# Patient Record
Sex: FEMALE | Race: WHITE | NOT HISPANIC OR LATINO | Employment: OTHER | URBAN - METROPOLITAN AREA
[De-identification: names, ages, dates, MRNs, and addresses within clinical notes are randomized per-mention and may not be internally consistent; named-entity substitution may affect disease eponyms.]

---

## 2023-05-30 DIAGNOSIS — E78.5 HYPERLIPIDEMIA, UNSPECIFIED HYPERLIPIDEMIA TYPE: Primary | ICD-10-CM

## 2023-05-30 RX ORDER — ATORVASTATIN CALCIUM 20 MG/1
TABLET, FILM COATED ORAL
Qty: 90 TABLET | Refills: 0 | Status: SHIPPED | OUTPATIENT
Start: 2023-05-30 | End: 2023-08-30

## 2023-07-18 ENCOUNTER — OFFICE VISIT (OUTPATIENT)
Dept: PRIMARY CARE | Facility: CLINIC | Age: 88
End: 2023-07-18
Payer: MEDICARE

## 2023-07-18 ENCOUNTER — LAB (OUTPATIENT)
Dept: LAB | Facility: LAB | Age: 88
End: 2023-07-18
Payer: MEDICARE

## 2023-07-18 VITALS
DIASTOLIC BLOOD PRESSURE: 80 MMHG | SYSTOLIC BLOOD PRESSURE: 130 MMHG | WEIGHT: 98.2 LBS | BODY MASS INDEX: 17.4 KG/M2 | HEIGHT: 63 IN

## 2023-07-18 DIAGNOSIS — R26.89 IMBALANCE: ICD-10-CM

## 2023-07-18 DIAGNOSIS — R73.01 IMPAIRED FASTING BLOOD SUGAR: ICD-10-CM

## 2023-07-18 DIAGNOSIS — Z00.00 ROUTINE GENERAL MEDICAL EXAMINATION AT HEALTH CARE FACILITY: ICD-10-CM

## 2023-07-18 DIAGNOSIS — E78.5 HYPERLIPIDEMIA, UNSPECIFIED HYPERLIPIDEMIA TYPE: ICD-10-CM

## 2023-07-18 DIAGNOSIS — I25.10 CORONARY ARTERY DISEASE INVOLVING NATIVE HEART WITHOUT ANGINA PECTORIS, UNSPECIFIED VESSEL OR LESION TYPE: ICD-10-CM

## 2023-07-18 DIAGNOSIS — M81.0 OSTEOPOROSIS, UNSPECIFIED OSTEOPOROSIS TYPE, UNSPECIFIED PATHOLOGICAL FRACTURE PRESENCE: ICD-10-CM

## 2023-07-18 DIAGNOSIS — E46 PROTEIN-CALORIE MALNUTRITION, UNSPECIFIED SEVERITY (MULTI): Primary | ICD-10-CM

## 2023-07-18 PROBLEM — H90.3 ASYMMETRIC SNHL (SENSORINEURAL HEARING LOSS): Status: ACTIVE | Noted: 2023-07-18

## 2023-07-18 PROBLEM — I71.60 THORACOABDOMINAL ANEURYSM (CMS-HCC): Status: ACTIVE | Noted: 2023-07-18

## 2023-07-18 PROBLEM — I71.9 AORTIC ANEURYSM (CMS-HCC): Status: ACTIVE | Noted: 2023-07-18

## 2023-07-18 PROBLEM — I71.21 ANEURYSM OF ASCENDING AORTA (CMS-HCC): Status: ACTIVE | Noted: 2023-07-18

## 2023-07-18 LAB
ALANINE AMINOTRANSFERASE (SGPT) (U/L) IN SER/PLAS: 21 U/L (ref 7–45)
ANION GAP IN SER/PLAS: 14 MMOL/L (ref 10–20)
CALCIDIOL (25 OH VITAMIN D3) (NG/ML) IN SER/PLAS: 55 NG/ML
CALCIUM (MG/DL) IN SER/PLAS: 9.2 MG/DL (ref 8.6–10.3)
CARBON DIOXIDE, TOTAL (MMOL/L) IN SER/PLAS: 29 MMOL/L (ref 21–32)
CHLORIDE (MMOL/L) IN SER/PLAS: 102 MMOL/L (ref 98–107)
CHOLESTEROL (MG/DL) IN SER/PLAS: 144 MG/DL (ref 0–199)
CHOLESTEROL IN HDL (MG/DL) IN SER/PLAS: 72 MG/DL
CHOLESTEROL/HDL RATIO: 2
CREATININE (MG/DL) IN SER/PLAS: 0.93 MG/DL (ref 0.5–1.05)
ERYTHROCYTE DISTRIBUTION WIDTH (RATIO) BY AUTOMATED COUNT: 12.7 % (ref 11.5–14.5)
ERYTHROCYTE MEAN CORPUSCULAR HEMOGLOBIN CONCENTRATION (G/DL) BY AUTOMATED: 31.7 G/DL (ref 32–36)
ERYTHROCYTE MEAN CORPUSCULAR VOLUME (FL) BY AUTOMATED COUNT: 97 FL (ref 80–100)
ERYTHROCYTES (10*6/UL) IN BLOOD BY AUTOMATED COUNT: 4.95 X10E12/L (ref 4–5.2)
GFR FEMALE: 60 ML/MIN/1.73M2
GLUCOSE (MG/DL) IN SER/PLAS: 88 MG/DL (ref 74–99)
HEMATOCRIT (%) IN BLOOD BY AUTOMATED COUNT: 48 % (ref 36–46)
HEMOGLOBIN (G/DL) IN BLOOD: 15.2 G/DL (ref 12–16)
LDL: 57 MG/DL (ref 0–99)
LEUKOCYTES (10*3/UL) IN BLOOD BY AUTOMATED COUNT: 7.5 X10E9/L (ref 4.4–11.3)
PLATELETS (10*3/UL) IN BLOOD AUTOMATED COUNT: 207 X10E9/L (ref 150–450)
POTASSIUM (MMOL/L) IN SER/PLAS: 4.3 MMOL/L (ref 3.5–5.3)
SODIUM (MMOL/L) IN SER/PLAS: 141 MMOL/L (ref 136–145)
TRIGLYCERIDE (MG/DL) IN SER/PLAS: 74 MG/DL (ref 0–149)
UREA NITROGEN (MG/DL) IN SER/PLAS: 20 MG/DL (ref 6–23)
VLDL: 15 MG/DL (ref 0–40)

## 2023-07-18 PROCEDURE — 82306 VITAMIN D 25 HYDROXY: CPT

## 2023-07-18 PROCEDURE — G0439 PPPS, SUBSEQ VISIT: HCPCS | Performed by: INTERNAL MEDICINE

## 2023-07-18 PROCEDURE — 1170F FXNL STATUS ASSESSED: CPT | Performed by: INTERNAL MEDICINE

## 2023-07-18 PROCEDURE — 83036 HEMOGLOBIN GLYCOSYLATED A1C: CPT

## 2023-07-18 PROCEDURE — 85027 COMPLETE CBC AUTOMATED: CPT

## 2023-07-18 PROCEDURE — 80048 BASIC METABOLIC PNL TOTAL CA: CPT

## 2023-07-18 PROCEDURE — 36415 COLL VENOUS BLD VENIPUNCTURE: CPT

## 2023-07-18 PROCEDURE — 84460 ALANINE AMINO (ALT) (SGPT): CPT

## 2023-07-18 PROCEDURE — 1160F RVW MEDS BY RX/DR IN RCRD: CPT | Performed by: INTERNAL MEDICINE

## 2023-07-18 PROCEDURE — 1159F MED LIST DOCD IN RCRD: CPT | Performed by: INTERNAL MEDICINE

## 2023-07-18 PROCEDURE — 1036F TOBACCO NON-USER: CPT | Performed by: INTERNAL MEDICINE

## 2023-07-18 PROCEDURE — 80061 LIPID PANEL: CPT

## 2023-07-18 ASSESSMENT — ACTIVITIES OF DAILY LIVING (ADL)
BATHING: INDEPENDENT
DOING_HOUSEWORK: INDEPENDENT
TAKING_MEDICATION: INDEPENDENT
MANAGING_FINANCES: INDEPENDENT
GROCERY_SHOPPING: INDEPENDENT
DRESSING: INDEPENDENT

## 2023-07-18 ASSESSMENT — PATIENT HEALTH QUESTIONNAIRE - PHQ9
1. LITTLE INTEREST OR PLEASURE IN DOING THINGS: NOT AT ALL
2. FEELING DOWN, DEPRESSED OR HOPELESS: NOT AT ALL
SUM OF ALL RESPONSES TO PHQ9 QUESTIONS 1 AND 2: 0
2. FEELING DOWN, DEPRESSED OR HOPELESS: NOT AT ALL
1. LITTLE INTEREST OR PLEASURE IN DOING THINGS: NOT AT ALL
SUM OF ALL RESPONSES TO PHQ9 QUESTIONS 1 AND 2: 0

## 2023-07-18 NOTE — PROGRESS NOTES
"Subjective   Reason for Visit: Catarina Cutler is an 88 y.o. female here for a Medicare Wellness visit.     Past Medical, Surgical, and Family History reviewed and updated in chart.    Reviewed all medications by prescribing practitioner or clinical pharmacist (such as prescriptions, OTCs, herbal therapies and supplements) and documented in the medical record.    HPI    Patient Care Team:  Edilia Alex MD as PCP - General  Edilia Alex MD as PCP - [facility] Attributed Provider     Review of Systems    Objective   Vitals:  /80   Ht 1.588 m (5' 2.5\")   Wt (!) 44.5 kg (98 lb 3.2 oz)   BMI 17.67 kg/m²       Physical Exam    Assessment/Plan   Problem List Items Addressed This Visit     CAD (coronary artery disease)    Relevant Orders    CBC    Basic Metabolic Panel    Hyperlipidemia    Relevant Orders    Alanine Aminotransferase    Lipid Panel    Imbalance    Relevant Orders    Referral to Physical Therapy    Impaired fasting blood sugar    Relevant Orders    Hemoglobin A1C    Osteoporosis    Relevant Orders    Vitamin D, Total    XR DEXA bone density    Protein-calorie malnutrition, unspecified severity (CMS/[facility]) - Primary   Other Visit Diagnoses     Routine general medical examination at health care facility                 "

## 2023-07-18 NOTE — PROGRESS NOTES
"Subjective   Reason for Visit: Catarina Cutler is an 88 y.o. female here for a Medicare Wellness visit.     Past Medical, Surgical, and Family History reviewed and updated in chart.    Reviewed all medications by prescribing practitioner or clinical pharmacist (such as prescriptions, OTCs, herbal therapies and supplements) and documented in the medical record.    HPI  #1 hyperlipidemia-on treatment. Trying to keep diet low in fat and sugar.  #2 Osteoporosis-no falls, no fx;s  #3 coronary artery disease- no chest pain. No shortness of breath. No PND/orthopnea.  #4 atherosclerotic vascular disease/multiple aneurysmsâ€“has not followed up with vascular surgery. No chest pain. No abdominal pain  #5 impaired fasting blood sugar-trying to keep sugar and processed carbohydrates limited in diet.     Patient Care Team:  Edilia Alex MD as PCP - General  Edilia Alex MD as PCP - [facility] Attributed Provider     Review of Systems    Objective   Vitals:  /80   Ht 1.588 m (5' 2.5\")   Wt (!) 44.5 kg (98 lb 3.2 oz)   BMI 17.67 kg/m²       Physical Exam  Constitutional:       General: She is not in acute distress.     Appearance: Normal appearance. She is not ill-appearing.   HENT:      Head: Normocephalic and atraumatic.      Right Ear: Tympanic membrane and ear canal normal.      Left Ear: Tympanic membrane and ear canal normal.      Nose: Nose normal.      Mouth/Throat:      Mouth: Mucous membranes are moist.      Pharynx: Oropharynx is clear.   Eyes:      General: No scleral icterus.     Extraocular Movements: Extraocular movements intact.      Conjunctiva/sclera: Conjunctivae normal.      Pupils: Pupils are equal, round, and reactive to light.   Cardiovascular:      Rate and Rhythm: Normal rate and regular rhythm.      Pulses: Normal pulses.      Heart sounds: No murmur heard.     No friction rub.   Pulmonary:      Effort: Pulmonary effort is normal.      Breath sounds: Normal breath sounds. No rhonchi or " rales.   Abdominal:      General: Abdomen is flat. Bowel sounds are normal. There is no distension.      Palpations: Abdomen is soft. There is no mass.      Tenderness: There is no abdominal tenderness.   Musculoskeletal:      Cervical back: Normal range of motion and neck supple.      Right lower leg: No edema.      Left lower leg: No edema.   Skin:     General: Skin is warm.   Neurological:      General: No focal deficit present.      Mental Status: She is alert and oriented to person, place, and time.      Cranial Nerves: No cranial nerve deficit.   Psychiatric:         Mood and Affect: Mood normal.         Behavior: Behavior normal.         Judgment: Judgment normal.         Assessment/Plan   Problem List Items Addressed This Visit    None  #1 hyperlipidemia- labs  #2 osteoporosis-self discontinued treatment approximately 2 1/2 years ago. Had been on Fosamax for 5+ years, off for ~2 years back on for approximately 3 years. Small amount of progression over the past 2 years per last DEXA. Reviewed this at length. Recommend resuming treatment. She voiced clear understanding of the risk of untreated osteoporosis but at this point wishes to stay off medicine. Recommended discussion with rheumatology. She declined. We did discuss risk of untreated osteoporosis. Will obtain repeat bone density test 1 years. Continue vitamin D and calcium.  #3 coronary artery disease-stable. Continue treatment. Continue lifestyle. Consider stress test in next 1 years.  #4 atherosclerotic vascular disease/aortic aneurysm- remains clinically stable. Notable progression on neck CT 2 yrs ptv. I reviewed this at length with patient again. Discussed risk of rupture, perhaps imminent naturegiven recent imaging. Discussed that this would be a devastating/catastrophic event. We discussed possible outcomes most likely death and also stroke/disability. Again, I strongly recommend ASAP evaluation with vascular surgery, dedicated CT angiogram of  chest, etc.   She voiced understanding of my recommendations and on the risk of rupture and its consequences.  She continues to decline any further evaluation. Declines evaluation with surgery, declines CT angiogram. Asked her to phone me immediately if she changes her mind and I will help coordinate the appointment ASAP. Again, I spent significant time stressing how important I felt this was and how at the very least I feel she should sit down and talk with the vascular specialist about her options. Recommend beginning beta-blocker, reviewed indications. She declines.  #5 hearing loss( asymmetric) and vertigo-stable/improved. Status post ENT eval. f/u audiology  #6  Imbalance-consult physical therapy.  Fall precautions.    #7 impaired fasting blood sugarreviewed. Discussed low sugar low, carb diet with exercise. Recheck  #8 neck asymmetry-normal CT scan.     reviewed need for mammo--> she declines. voiced understand. -

## 2023-07-19 LAB
ESTIMATED AVERAGE GLUCOSE FOR HBA1C: 120 MG/DL
HEMOGLOBIN A1C/HEMOGLOBIN TOTAL IN BLOOD: 5.8 %

## 2023-07-27 ENCOUNTER — TELEPHONE (OUTPATIENT)
Dept: PRIMARY CARE | Facility: CLINIC | Age: 88
End: 2023-07-27
Payer: MEDICARE

## 2023-07-27 NOTE — TELEPHONE ENCOUNTER
----- Message from Edilia Alex MD sent at 7/27/2023  5:46 AM EDT -----  Notify patient bone density shows some progression of bone loss.  Recommend follow-up appointment with me to review treatment options or consult to rheumatology, bone density specialist.

## 2023-07-28 ENCOUNTER — TELEPHONE (OUTPATIENT)
Dept: PRIMARY CARE | Facility: CLINIC | Age: 88
End: 2023-07-28

## 2023-07-28 ENCOUNTER — OFFICE VISIT (OUTPATIENT)
Dept: PRIMARY CARE | Facility: CLINIC | Age: 88
End: 2023-07-28
Payer: MEDICARE

## 2023-07-28 VITALS
SYSTOLIC BLOOD PRESSURE: 142 MMHG | TEMPERATURE: 97.4 F | OXYGEN SATURATION: 96 % | BODY MASS INDEX: 18.25 KG/M2 | HEART RATE: 70 BPM | DIASTOLIC BLOOD PRESSURE: 78 MMHG | WEIGHT: 101.4 LBS

## 2023-07-28 DIAGNOSIS — M81.0 OSTEOPOROSIS, UNSPECIFIED OSTEOPOROSIS TYPE, UNSPECIFIED PATHOLOGICAL FRACTURE PRESENCE: Primary | ICD-10-CM

## 2023-07-28 PROCEDURE — 1160F RVW MEDS BY RX/DR IN RCRD: CPT | Performed by: INTERNAL MEDICINE

## 2023-07-28 PROCEDURE — 1036F TOBACCO NON-USER: CPT | Performed by: INTERNAL MEDICINE

## 2023-07-28 PROCEDURE — 99213 OFFICE O/P EST LOW 20 MIN: CPT | Performed by: INTERNAL MEDICINE

## 2023-07-28 PROCEDURE — 1159F MED LIST DOCD IN RCRD: CPT | Performed by: INTERNAL MEDICINE

## 2023-07-28 RX ORDER — DENOSUMAB 60 MG/ML
60 INJECTION SUBCUTANEOUS ONCE
Qty: 1 ML | Refills: 0 | Status: SHIPPED | OUTPATIENT
Start: 2023-07-28 | End: 2023-07-28

## 2023-07-28 ASSESSMENT — PATIENT HEALTH QUESTIONNAIRE - PHQ9
1. LITTLE INTEREST OR PLEASURE IN DOING THINGS: NOT AT ALL
2. FEELING DOWN, DEPRESSED OR HOPELESS: NOT AT ALL
SUM OF ALL RESPONSES TO PHQ9 QUESTIONS 1 AND 2: 0

## 2023-07-28 NOTE — TELEPHONE ENCOUNTER
LM for pt to call and schedule Prolia Inj (in fridge) at any time.     Prescribed at today's visit by Dr Alex.  [employer] submitted and no authorization is required

## 2023-07-28 NOTE — PROGRESS NOTES
Subjective   Patient ID: Catarina Cutler is a 88 y.o. female who presents for follow up labs.    HPI     Review of Systems    Objective   /78 (BP Location: Left arm, Patient Position: Sitting, BP Cuff Size: Adult)   Pulse 70   Temp 36.3 °C (97.4 °F) (Temporal)   Wt 46 kg (101 lb 6.4 oz)   SpO2 96%   BMI 18.25 kg/m²     Physical Exam  === 07/18/23 ===    DEXA BONE DENSITY      Lab Results   Component Value Date    WBC 7.5 07/18/2023    HGB 15.2 07/18/2023    HCT 48.0 (H) 07/18/2023     07/18/2023    CHOL 144 07/18/2023    TRIG 74 07/18/2023    HDL 72.0 07/18/2023    ALT 21 07/18/2023    AST 21 06/18/2019     07/18/2023    K 4.3 07/18/2023     07/18/2023    CREATININE 0.93 07/18/2023    BUN 20 07/18/2023    CO2 29 07/18/2023    TSH 1.03 07/15/2022    HGBA1C 5.8 (A) 07/18/2023       Assessment/Plan     Osteoporosis.  Progressed.  Had been on Fosamax for 5 years but off for 3+ years.  Will resume treatment.  Patient would like to use Prolia as does have an issue with reflux.  Reviewed side effects at length with patient.

## 2023-07-31 ENCOUNTER — CLINICAL SUPPORT (OUTPATIENT)
Dept: PRIMARY CARE | Facility: CLINIC | Age: 88
End: 2023-07-31
Payer: MEDICARE

## 2023-07-31 DIAGNOSIS — M81.0 OSTEOPOROSIS, UNSPECIFIED OSTEOPOROSIS TYPE, UNSPECIFIED PATHOLOGICAL FRACTURE PRESENCE: ICD-10-CM

## 2023-07-31 PROCEDURE — 96372 THER/PROPH/DIAG INJ SC/IM: CPT | Performed by: INTERNAL MEDICINE

## 2023-08-09 ENCOUNTER — TELEPHONE (OUTPATIENT)
Dept: PRIMARY CARE | Facility: CLINIC | Age: 88
End: 2023-08-09
Payer: MEDICARE
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
## 2023-08-14 ENCOUNTER — OFFICE VISIT (OUTPATIENT)
Dept: PRIMARY CARE | Facility: CLINIC | Age: 88
End: 2023-08-14
Payer: MEDICARE

## 2023-08-14 VITALS
TEMPERATURE: 97.6 F | SYSTOLIC BLOOD PRESSURE: 116 MMHG | WEIGHT: 99.8 LBS | DIASTOLIC BLOOD PRESSURE: 70 MMHG | BODY MASS INDEX: 17.96 KG/M2

## 2023-08-14 DIAGNOSIS — R41.89 SUBJECTIVE MEMORY COMPLAINTS: Primary | ICD-10-CM

## 2023-08-14 PROCEDURE — 1159F MED LIST DOCD IN RCRD: CPT | Performed by: INTERNAL MEDICINE

## 2023-08-14 PROCEDURE — 1160F RVW MEDS BY RX/DR IN RCRD: CPT | Performed by: INTERNAL MEDICINE

## 2023-08-14 PROCEDURE — 1036F TOBACCO NON-USER: CPT | Performed by: INTERNAL MEDICINE

## 2023-08-14 PROCEDURE — 99213 OFFICE O/P EST LOW 20 MIN: CPT | Performed by: INTERNAL MEDICINE

## 2023-08-14 RX ORDER — ASPIRIN 81 MG/1
81 TABLET ORAL
COMMUNITY
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
## 2023-08-30 DIAGNOSIS — E78.5 HYPERLIPIDEMIA, UNSPECIFIED HYPERLIPIDEMIA TYPE: ICD-10-CM

## 2023-08-30 RX ORDER — ATORVASTATIN CALCIUM 20 MG/1
20 TABLET, FILM COATED ORAL DAILY
Qty: 90 TABLET | Refills: 0 | Status: SHIPPED | OUTPATIENT
Start: 2023-08-30 | End: 2023-12-04

## 2023-12-04 DIAGNOSIS — E78.5 HYPERLIPIDEMIA, UNSPECIFIED HYPERLIPIDEMIA TYPE: ICD-10-CM

## 2023-12-04 RX ORDER — ATORVASTATIN CALCIUM 20 MG/1
20 TABLET, FILM COATED ORAL DAILY
Qty: 90 TABLET | Refills: 0 | Status: SHIPPED | OUTPATIENT
Start: 2023-12-04 | End: 2024-03-13

## 2024-01-30 ENCOUNTER — TELEPHONE (OUTPATIENT)
Dept: PRIMARY CARE | Facility: CLINIC | Age: 89
End: 2024-01-30
Payer: MEDICARE

## 2024-01-30 DIAGNOSIS — M81.0 OSTEOPOROSIS, UNSPECIFIED OSTEOPOROSIS TYPE, UNSPECIFIED PATHOLOGICAL FRACTURE PRESENCE: ICD-10-CM

## 2024-01-30 NOTE — TELEPHONE ENCOUNTER
Patient is scheduled for next Prolia Injection 2/2/24-BMP order entered into system (verbally ok'd per Dr. Alex) -LM notifying pt BW results needed reviewed prior to injection, that order for BMP is in system, and tc and rs if not able to have done prior to 2/2/24    Please advise if pt is to continue with injections.

## 2024-02-02 ENCOUNTER — APPOINTMENT (OUTPATIENT)
Dept: PRIMARY CARE | Facility: CLINIC | Age: 89
End: 2024-02-02
Payer: MEDICARE

## 2024-02-05 ENCOUNTER — LAB (OUTPATIENT)
Dept: LAB | Facility: LAB | Age: 89
End: 2024-02-05
Payer: MEDICARE

## 2024-02-05 ENCOUNTER — APPOINTMENT (OUTPATIENT)
Dept: PRIMARY CARE | Facility: CLINIC | Age: 89
End: 2024-02-05
Payer: MEDICARE

## 2024-02-05 DIAGNOSIS — M81.0 OSTEOPOROSIS, UNSPECIFIED OSTEOPOROSIS TYPE, UNSPECIFIED PATHOLOGICAL FRACTURE PRESENCE: ICD-10-CM

## 2024-02-05 LAB
ANION GAP SERPL CALC-SCNC: 12 MMOL/L (ref 10–20)
BUN SERPL-MCNC: 21 MG/DL (ref 6–23)
CALCIUM SERPL-MCNC: 8.7 MG/DL (ref 8.6–10.3)
CHLORIDE SERPL-SCNC: 100 MMOL/L (ref 98–107)
CO2 SERPL-SCNC: 31 MMOL/L (ref 21–32)
CREAT SERPL-MCNC: 0.89 MG/DL (ref 0.5–1.05)
EGFRCR SERPLBLD CKD-EPI 2021: 63 ML/MIN/1.73M*2
GLUCOSE SERPL-MCNC: 93 MG/DL (ref 74–99)
POTASSIUM SERPL-SCNC: 4.2 MMOL/L (ref 3.5–5.3)
SODIUM SERPL-SCNC: 139 MMOL/L (ref 136–145)

## 2024-02-05 PROCEDURE — 36415 COLL VENOUS BLD VENIPUNCTURE: CPT

## 2024-02-05 PROCEDURE — 80048 BASIC METABOLIC PNL TOTAL CA: CPT

## 2024-02-07 ENCOUNTER — CLINICAL SUPPORT (OUTPATIENT)
Dept: PRIMARY CARE | Facility: CLINIC | Age: 89
End: 2024-02-07
Payer: MEDICARE

## 2024-02-07 DIAGNOSIS — M81.0 OSTEOPOROSIS, UNSPECIFIED OSTEOPOROSIS TYPE, UNSPECIFIED PATHOLOGICAL FRACTURE PRESENCE: ICD-10-CM

## 2024-02-07 PROCEDURE — 96372 THER/PROPH/DIAG INJ SC/IM: CPT | Performed by: INTERNAL MEDICINE

## 2024-02-07 NOTE — PROGRESS NOTES
NURSE VISIT PER DR CARRASQUILLO FOR A PROLIA 60 MG /ML INJECTION GIVEN  SUBCUTANEOUS IN THE RIGHT ARM    IXB=[id number]  EXP=05/31/2026  NEC= [id number]  [facility]

## 2024-02-26 ENCOUNTER — HOSPITAL ENCOUNTER (EMERGENCY)
Facility: HOSPITAL | Age: 89
Discharge: HOME | End: 2024-02-26
Attending: EMERGENCY MEDICINE
Payer: MEDICARE

## 2024-02-26 ENCOUNTER — APPOINTMENT (OUTPATIENT)
Dept: RADIOLOGY | Facility: HOSPITAL | Age: 89
End: 2024-02-26
Payer: MEDICARE

## 2024-02-26 VITALS
BODY MASS INDEX: 16.99 KG/M2 | TEMPERATURE: 97.9 F | SYSTOLIC BLOOD PRESSURE: 149 MMHG | HEIGHT: 61 IN | HEART RATE: 70 BPM | OXYGEN SATURATION: 96 % | WEIGHT: 90 LBS | DIASTOLIC BLOOD PRESSURE: 119 MMHG | RESPIRATION RATE: 16 BRPM

## 2024-02-26 DIAGNOSIS — R51.9 NONINTRACTABLE HEADACHE, UNSPECIFIED CHRONICITY PATTERN, UNSPECIFIED HEADACHE TYPE: Primary | ICD-10-CM

## 2024-02-26 LAB
ALBUMIN SERPL BCP-MCNC: 3.8 G/DL (ref 3.4–5)
ALP SERPL-CCNC: 103 U/L (ref 33–136)
ALT SERPL W P-5'-P-CCNC: 24 U/L (ref 7–45)
ANION GAP SERPL CALC-SCNC: 13 MMOL/L (ref 10–20)
APTT PPP: 23 SECONDS (ref 27–38)
AST SERPL W P-5'-P-CCNC: 32 U/L (ref 9–39)
BASOPHILS # BLD AUTO: 0.04 X10*3/UL (ref 0–0.1)
BASOPHILS NFR BLD AUTO: 0.6 %
BILIRUB SERPL-MCNC: 0.7 MG/DL (ref 0–1.2)
BUN SERPL-MCNC: 13 MG/DL (ref 6–23)
CALCIUM SERPL-MCNC: 8.3 MG/DL (ref 8.6–10.3)
CHLORIDE SERPL-SCNC: 100 MMOL/L (ref 98–107)
CO2 SERPL-SCNC: 29 MMOL/L (ref 21–32)
CREAT SERPL-MCNC: 0.78 MG/DL (ref 0.5–1.05)
EGFRCR SERPLBLD CKD-EPI 2021: 74 ML/MIN/1.73M*2
EOSINOPHIL # BLD AUTO: 0.12 X10*3/UL (ref 0–0.4)
EOSINOPHIL NFR BLD AUTO: 1.9 %
ERYTHROCYTE [DISTWIDTH] IN BLOOD BY AUTOMATED COUNT: 13 % (ref 11.5–14.5)
GLUCOSE SERPL-MCNC: 90 MG/DL (ref 74–99)
HCT VFR BLD AUTO: 42.8 % (ref 36–46)
HGB BLD-MCNC: 14 G/DL (ref 12–16)
IMM GRANULOCYTES # BLD AUTO: 0.02 X10*3/UL (ref 0–0.5)
IMM GRANULOCYTES NFR BLD AUTO: 0.3 % (ref 0–0.9)
INR PPP: 1 (ref 0.9–1.1)
LYMPHOCYTES # BLD AUTO: 0.98 X10*3/UL (ref 0.8–3)
LYMPHOCYTES NFR BLD AUTO: 15.3 %
MCH RBC QN AUTO: 30.6 PG (ref 26–34)
MCHC RBC AUTO-ENTMCNC: 32.7 G/DL (ref 32–36)
MCV RBC AUTO: 93 FL (ref 80–100)
MONOCYTES # BLD AUTO: 0.46 X10*3/UL (ref 0.05–0.8)
MONOCYTES NFR BLD AUTO: 7.2 %
NEUTROPHILS # BLD AUTO: 4.8 X10*3/UL (ref 1.6–5.5)
NEUTROPHILS NFR BLD AUTO: 74.7 %
NRBC BLD-RTO: 0 /100 WBCS (ref 0–0)
PLATELET # BLD AUTO: 171 X10*3/UL (ref 150–450)
POTASSIUM SERPL-SCNC: 4.5 MMOL/L (ref 3.5–5.3)
PROT SERPL-MCNC: 7 G/DL (ref 6.4–8.2)
PROTHROMBIN TIME: 11.1 SECONDS (ref 9.8–12.8)
RBC # BLD AUTO: 4.58 X10*6/UL (ref 4–5.2)
SODIUM SERPL-SCNC: 137 MMOL/L (ref 136–145)
WBC # BLD AUTO: 6.4 X10*3/UL (ref 4.4–11.3)

## 2024-02-26 PROCEDURE — 2500000004 HC RX 250 GENERAL PHARMACY W/ HCPCS (ALT 636 FOR OP/ED): Performed by: EMERGENCY MEDICINE

## 2024-02-26 PROCEDURE — 2500000004 HC RX 250 GENERAL PHARMACY W/ HCPCS (ALT 636 FOR OP/ED)

## 2024-02-26 PROCEDURE — 80053 COMPREHEN METABOLIC PANEL: CPT | Performed by: EMERGENCY MEDICINE

## 2024-02-26 PROCEDURE — 2550000001 HC RX 255 CONTRASTS: Performed by: EMERGENCY MEDICINE

## 2024-02-26 PROCEDURE — 96375 TX/PRO/DX INJ NEW DRUG ADDON: CPT

## 2024-02-26 PROCEDURE — 36415 COLL VENOUS BLD VENIPUNCTURE: CPT | Performed by: EMERGENCY MEDICINE

## 2024-02-26 PROCEDURE — 99284 EMERGENCY DEPT VISIT MOD MDM: CPT | Mod: 25

## 2024-02-26 PROCEDURE — 85730 THROMBOPLASTIN TIME PARTIAL: CPT | Mod: 91 | Performed by: EMERGENCY MEDICINE

## 2024-02-26 PROCEDURE — 85025 COMPLETE CBC W/AUTO DIFF WBC: CPT | Performed by: EMERGENCY MEDICINE

## 2024-02-26 PROCEDURE — 70498 CT ANGIOGRAPHY NECK: CPT

## 2024-02-26 PROCEDURE — 96365 THER/PROPH/DIAG IV INF INIT: CPT

## 2024-02-26 PROCEDURE — 70496 CT ANGIOGRAPHY HEAD: CPT | Performed by: RADIOLOGY

## 2024-02-26 PROCEDURE — 70498 CT ANGIOGRAPHY NECK: CPT | Performed by: RADIOLOGY

## 2024-02-26 RX ORDER — MAGNESIUM SULFATE HEPTAHYDRATE 40 MG/ML
2 INJECTION, SOLUTION INTRAVENOUS ONCE
Status: COMPLETED | OUTPATIENT
Start: 2024-02-26 | End: 2024-02-26

## 2024-02-26 RX ORDER — DIPHENHYDRAMINE HYDROCHLORIDE 50 MG/ML
25 INJECTION INTRAMUSCULAR; INTRAVENOUS ONCE
Status: COMPLETED | OUTPATIENT
Start: 2024-02-26 | End: 2024-02-26

## 2024-02-26 RX ORDER — KETOROLAC TROMETHAMINE 30 MG/ML
15 INJECTION, SOLUTION INTRAMUSCULAR; INTRAVENOUS ONCE
Status: COMPLETED | OUTPATIENT
Start: 2024-02-26 | End: 2024-02-26

## 2024-02-26 RX ORDER — MAGNESIUM SULFATE HEPTAHYDRATE 40 MG/ML
INJECTION, SOLUTION INTRAVENOUS
Status: COMPLETED
Start: 2024-02-26 | End: 2024-02-26

## 2024-02-26 RX ORDER — KETOROLAC TROMETHAMINE 30 MG/ML
INJECTION, SOLUTION INTRAMUSCULAR; INTRAVENOUS
Status: COMPLETED
Start: 2024-02-26 | End: 2024-02-26

## 2024-02-26 RX ORDER — METOCLOPRAMIDE HYDROCHLORIDE 5 MG/ML
10 INJECTION INTRAMUSCULAR; INTRAVENOUS ONCE
Status: COMPLETED | OUTPATIENT
Start: 2024-02-26 | End: 2024-02-26

## 2024-02-26 RX ADMIN — MAGNESIUM SULFATE HEPTAHYDRATE 2 G: 40 INJECTION, SOLUTION INTRAVENOUS at 03:55

## 2024-02-26 RX ADMIN — METOCLOPRAMIDE 10 MG: 5 INJECTION, SOLUTION INTRAMUSCULAR; INTRAVENOUS at 01:57

## 2024-02-26 RX ADMIN — KETOROLAC TROMETHAMINE 15 MG: 30 INJECTION, SOLUTION INTRAMUSCULAR; INTRAVENOUS at 03:55

## 2024-02-26 RX ADMIN — KETOROLAC TROMETHAMINE 15 MG: 30 INJECTION, SOLUTION INTRAMUSCULAR at 03:55

## 2024-02-26 RX ADMIN — DIPHENHYDRAMINE HYDROCHLORIDE 25 MG: 50 INJECTION, SOLUTION INTRAMUSCULAR; INTRAVENOUS at 01:57

## 2024-02-26 RX ADMIN — IOHEXOL 75 ML: 350 INJECTION, SOLUTION INTRAVENOUS at 03:45

## 2024-02-26 RX ADMIN — SODIUM CHLORIDE 1000 ML: 9 INJECTION, SOLUTION INTRAVENOUS at 01:57

## 2024-02-26 ASSESSMENT — COLUMBIA-SUICIDE SEVERITY RATING SCALE - C-SSRS
2. HAVE YOU ACTUALLY HAD ANY THOUGHTS OF KILLING YOURSELF?: NO
6. HAVE YOU EVER DONE ANYTHING, STARTED TO DO ANYTHING, OR PREPARED TO DO ANYTHING TO END YOUR LIFE?: NO
1. IN THE PAST MONTH, HAVE YOU WISHED YOU WERE DEAD OR WISHED YOU COULD GO TO SLEEP AND NOT WAKE UP?: NO

## 2024-02-26 ASSESSMENT — PAIN DESCRIPTION - LOCATION: LOCATION: HEAD

## 2024-02-26 ASSESSMENT — PAIN - FUNCTIONAL ASSESSMENT: PAIN_FUNCTIONAL_ASSESSMENT: 0-10

## 2024-02-26 ASSESSMENT — PAIN SCALES - GENERAL: PAINLEVEL_OUTOF10: 10 - WORST POSSIBLE PAIN

## 2024-02-26 NOTE — ED PROVIDER NOTES
HPI   Chief Complaint   Patient presents with    Headache     Patient presents to the ED via EMS with c/o a headache since yesterday.       HPI  The patient presents with a headache beginning throughout the day and worsening gradually but does not more severe than that she has had in the past.  She has a history of a spontaneous brain bleed.  She has history of thoracic aneurysms in the past.  She denies any numbness, weakness, vision changes, chest pain, vomiting or other symptoms besides the pain itself.                  Chicho Coma Scale Score: 15                     Patient History   Past Medical History:   Diagnosis Date    Other specified health status 06/24/2015    No pertinent past medical history     Past Surgical History:   Procedure Laterality Date    APPENDECTOMY  03/12/2014    Appendectomy    CT ABDOMEN PELVIS ANGIOGRAM W AND/OR WO IV CONTRAST  6/6/2013    CT ABDOMEN PELVIS ANGIOGRAM W AND/OR WO IV CONTRAST 6/6/2013 [facility] ANCILLARY LEGACY    CT ABDOMEN PELVIS ANGIOGRAM W AND/OR WO IV CONTRAST  9/11/2014    CT ABDOMEN PELVIS ANGIOGRAM W AND/OR WO IV CONTRAST 9/11/2014 [facility]    CT ABDOMEN PELVIS ANGIOGRAM W AND/OR WO IV CONTRAST  6/27/2017    CT ABDOMEN PELVIS ANGIOGRAM W AND/OR WO IV CONTRAST 6/27/2017 [facility] ANCILLARY LEGACY    CT ABDOMEN PELVIS ANGIOGRAM W AND/OR WO IV CONTRAST  1/27/2012    CT ABDOMEN PELVIS ANGIOGRAM W AND/OR WO IV CONTRAST 1/27/2012 [facility] ANCILLARY LEGACY    HYSTERECTOMY  03/12/2014    Hysterectomy    MR HEAD ANGIO WO IV CONTRAST  3/21/2012    MR HEAD ANGIO WO IV CONTRAST 3/21/2012 [facility] CLINICAL LEGACY    MR NECK ANGIO WO IV CONTRAST  3/21/2012    MR NECK ANGIO WO IV CONTRAST 3/21/2012 [facility] CLINICAL LEGACY    OTHER SURGICAL HISTORY  03/12/2014    Aortic Arch Dissection Repair    OTHER SURGICAL HISTORY  09/30/2014    Laparoscopic Cholecystectomy With Cholangiography    TONSILLECTOMY  03/12/2014    Tonsillectomy With Adenoidectomy    TOTAL SHOULDER ARTHROPLASTY  03/12/2014     Shoulder Arthroplasty Total Shoulder Replacement     Family History   Problem Relation Name Age of Onset    Other (cardiac disorder) Father      Asthma Father       Social History     Tobacco Use    Smoking status: Former     Types: Cigarettes    Smokeless tobacco: Never   Substance Use Topics    Alcohol use: Never    Drug use: Never       Physical Exam   ED Triage Vitals   Temperature Heart Rate Respirations BP   02/26/24 0040 02/26/24 0045 02/26/24 0040 02/26/24 0040   36.6 °C (97.9 °F) 63 16 177/89      Pulse Ox Temp src Heart Rate Source Patient Position   02/26/24 0045 -- -- --   95 %         BP Location FiO2 (%)     -- --             Physical Exam  Vitals and nursing note reviewed.   Constitutional:       General: She is not in acute distress.     Appearance: She is well-developed.   HENT:      Head: Normocephalic and atraumatic.   Eyes:      Conjunctiva/sclera: Conjunctivae normal.   Cardiovascular:      Rate and Rhythm: Normal rate and regular rhythm.      Heart sounds: No murmur heard.  Pulmonary:      Effort: Pulmonary effort is normal. No respiratory distress.      Breath sounds: Normal breath sounds.   Abdominal:      Palpations: Abdomen is soft.      Tenderness: There is no abdominal tenderness.   Musculoskeletal:         General: No swelling.      Cervical back: Neck supple.   Skin:     General: Skin is warm and dry.      Capillary Refill: Capillary refill takes less than 2 seconds.   Neurological:      Mental Status: She is alert.   Psychiatric:         Mood and Affect: Mood normal.         ED Course & MDM   Diagnoses as of 02/26/24 0452   Nonintractable headache, unspecified chronicity pattern, unspecified headache type       Medical Decision Making  The patient is any age of 2.  However given her history of brain bleed in the past and onset 1 6 hours I did think a CTA of the head.  No obvious intracerebral aneurysm.  Patient does have a thoracic aneurysm which is apparently stable per radiology  report.    Procedure  Procedures     Sudhakar Moncada MD  02/26/24 0456

## 2024-03-13 DIAGNOSIS — E78.5 HYPERLIPIDEMIA, UNSPECIFIED HYPERLIPIDEMIA TYPE: ICD-10-CM

## 2024-03-13 RX ORDER — ATORVASTATIN CALCIUM 20 MG/1
20 TABLET, FILM COATED ORAL DAILY
Qty: 90 TABLET | Refills: 0 | Status: SHIPPED | OUTPATIENT
Start: 2024-03-13

## 2024-06-14 DIAGNOSIS — E78.5 HYPERLIPIDEMIA, UNSPECIFIED HYPERLIPIDEMIA TYPE: ICD-10-CM

## 2024-06-14 RX ORDER — ATORVASTATIN CALCIUM 20 MG/1
20 TABLET, FILM COATED ORAL DAILY
Qty: 90 TABLET | Refills: 0 | Status: SHIPPED | OUTPATIENT
Start: 2024-06-14

## 2024-07-15 ENCOUNTER — TELEPHONE (OUTPATIENT)
Dept: PRIMARY CARE | Facility: CLINIC | Age: 89
End: 2024-07-15
Payer: MEDICARE

## 2024-07-15 NOTE — TELEPHONE ENCOUNTER
"Patient is due for her next Prolia injection in August 2024.      Please enter referral to \"Clinical Pharmacy\" with \"Prolia-ship to office\" in the reason for referral area.      Patient will also need BMP performed prior to injection-please enter order.     Once medication is received in office,  I will reach out and schedule NV.     Thank you  "

## 2024-07-16 DIAGNOSIS — M81.0 OSTEOPOROSIS, UNSPECIFIED OSTEOPOROSIS TYPE, UNSPECIFIED PATHOLOGICAL FRACTURE PRESENCE: Primary | ICD-10-CM

## 2024-07-18 ENCOUNTER — APPOINTMENT (OUTPATIENT)
Dept: PRIMARY CARE | Facility: CLINIC | Age: 89
End: 2024-07-18
Payer: MEDICARE

## 2024-07-18 ENCOUNTER — TELEMEDICINE (OUTPATIENT)
Dept: PHARMACY | Facility: HOSPITAL | Age: 89
End: 2024-07-18
Payer: MEDICARE

## 2024-07-18 ENCOUNTER — LAB (OUTPATIENT)
Dept: LAB | Facility: LAB | Age: 89
End: 2024-07-18
Payer: MEDICARE

## 2024-07-18 VITALS
DIASTOLIC BLOOD PRESSURE: 72 MMHG | HEIGHT: 61 IN | TEMPERATURE: 98.1 F | WEIGHT: 91.8 LBS | SYSTOLIC BLOOD PRESSURE: 118 MMHG | BODY MASS INDEX: 17.33 KG/M2

## 2024-07-18 DIAGNOSIS — Z00.00 WELL ADULT EXAM: ICD-10-CM

## 2024-07-18 DIAGNOSIS — R73.01 IMPAIRED FASTING BLOOD SUGAR: ICD-10-CM

## 2024-07-18 DIAGNOSIS — M81.0 OSTEOPOROSIS, UNSPECIFIED OSTEOPOROSIS TYPE, UNSPECIFIED PATHOLOGICAL FRACTURE PRESENCE: ICD-10-CM

## 2024-07-18 DIAGNOSIS — Z00.00 WELL ADULT EXAM: Primary | ICD-10-CM

## 2024-07-18 LAB
ALT SERPL W P-5'-P-CCNC: 18 U/L (ref 7–45)
ANION GAP SERPL CALC-SCNC: 10 MMOL/L (ref 10–20)
BUN SERPL-MCNC: 13 MG/DL (ref 6–23)
CALCIUM SERPL-MCNC: 8.6 MG/DL (ref 8.6–10.3)
CHLORIDE SERPL-SCNC: 100 MMOL/L (ref 98–107)
CHOLEST SERPL-MCNC: 122 MG/DL (ref 0–199)
CHOLESTEROL/HDL RATIO: 2
CO2 SERPL-SCNC: 30 MMOL/L (ref 21–32)
CREAT SERPL-MCNC: 0.79 MG/DL (ref 0.5–1.05)
EGFRCR SERPLBLD CKD-EPI 2021: 73 ML/MIN/1.73M*2
ERYTHROCYTE [DISTWIDTH] IN BLOOD BY AUTOMATED COUNT: 12.5 % (ref 11.5–14.5)
GLUCOSE SERPL-MCNC: 87 MG/DL (ref 74–99)
HCT VFR BLD AUTO: 45.4 % (ref 36–46)
HDLC SERPL-MCNC: 60 MG/DL
HGB BLD-MCNC: 14.6 G/DL (ref 12–16)
LDLC SERPL CALC-MCNC: 48 MG/DL
MCH RBC QN AUTO: 31 PG (ref 26–34)
MCHC RBC AUTO-ENTMCNC: 32.2 G/DL (ref 32–36)
MCV RBC AUTO: 96 FL (ref 80–100)
NON HDL CHOLESTEROL: 62 MG/DL (ref 0–149)
NRBC BLD-RTO: 0 /100 WBCS (ref 0–0)
PLATELET # BLD AUTO: 183 X10*3/UL (ref 150–450)
POTASSIUM SERPL-SCNC: 4.4 MMOL/L (ref 3.5–5.3)
RBC # BLD AUTO: 4.71 X10*6/UL (ref 4–5.2)
SODIUM SERPL-SCNC: 136 MMOL/L (ref 136–145)
TRIGL SERPL-MCNC: 71 MG/DL (ref 0–149)
TSH SERPL-ACNC: 1.67 MIU/L (ref 0.44–3.98)
VLDL: 14 MG/DL (ref 0–40)
WBC # BLD AUTO: 6.2 X10*3/UL (ref 4.4–11.3)

## 2024-07-18 PROCEDURE — 36415 COLL VENOUS BLD VENIPUNCTURE: CPT

## 2024-07-18 PROCEDURE — 1036F TOBACCO NON-USER: CPT | Performed by: INTERNAL MEDICINE

## 2024-07-18 PROCEDURE — 1160F RVW MEDS BY RX/DR IN RCRD: CPT | Performed by: INTERNAL MEDICINE

## 2024-07-18 PROCEDURE — 1124F ACP DISCUSS-NO DSCNMKR DOCD: CPT | Performed by: INTERNAL MEDICINE

## 2024-07-18 PROCEDURE — G0439 PPPS, SUBSEQ VISIT: HCPCS | Performed by: INTERNAL MEDICINE

## 2024-07-18 PROCEDURE — 1170F FXNL STATUS ASSESSED: CPT | Performed by: INTERNAL MEDICINE

## 2024-07-18 PROCEDURE — 1157F ADVNC CARE PLAN IN RCRD: CPT | Performed by: INTERNAL MEDICINE

## 2024-07-18 PROCEDURE — 1159F MED LIST DOCD IN RCRD: CPT | Performed by: INTERNAL MEDICINE

## 2024-07-18 RX ORDER — DENOSUMAB 60 MG/ML
60 INJECTION SUBCUTANEOUS
Qty: 1 ML | Refills: 1 | Status: SHIPPED | OUTPATIENT
Start: 2024-07-18

## 2024-07-18 ASSESSMENT — PATIENT HEALTH QUESTIONNAIRE - PHQ9
2. FEELING DOWN, DEPRESSED OR HOPELESS: NOT AT ALL
SUM OF ALL RESPONSES TO PHQ9 QUESTIONS 1 AND 2: 0
1. LITTLE INTEREST OR PLEASURE IN DOING THINGS: NOT AT ALL

## 2024-07-18 NOTE — PROGRESS NOTES
"Subjective   Reason for Visit: Catarina Cutler is an 89 y.o. female here for a Medicare Wellness visit.     Past Medical, Surgical, and Family History reviewed and updated in chart.         HPI    #1 hyperlipidemia-on treatment. Trying to keep diet low in fat and sugar.  #2 Osteoporosis-no falls, no fx;s  #3 coronary artery disease- no chest pain. No shortness of breath. No PND/orthopnea.  #4 atherosclerotic vascular disease/multiple aneurysmsâ€“has not followed up with vascular surgery. No chest pain. No abdominal pain  #5 impaired fasting blood sugar-trying to keep sugar and processed carbohydrates limited in diet.     Patient Care Team:  Edilia Alex MD as PCP - General  Edilia Alex MD as PCP - [facility] Attributed Provider     Review of Systems    Objective   Vitals:  /72   Temp 36.7 °C (98.1 °F)   Ht 1.556 m (5' 1.26\")   Wt (!) 41.6 kg (91 lb 12.8 oz)   BMI 17.20 kg/m²       Physical Exam    Assessment/Plan   Problem List Items Addressed This Visit    None  #1 hyperlipidemia- labs  #2 osteoporosis- on rx, con't   #3 coronary artery disease-stable. Continue treatment. Continue lifestyle. Consider stress test in next 1 years- she declines.  #4 atherosclerotic vascular disease/aortic aneurysm- remains clinically stable.   Declines evaluation with surgery, declines CT angiogram.    #5 hearing loss( asymmetric) and vertigo-stable/improved. Status post ENT eval. f/u audiology  #6  Imbalance-consult physical therapy.  Fall precautions.    #7 impaired fasting blood sugarreviewed. Discussed low sugar low, carb diet with exercise. Recheck  #8 neck asymmetry-normal CT scan.     reviewed need for mammo--> she declines. voiced understand. -            "

## 2024-07-18 NOTE — PROGRESS NOTES
"Pharmacist Clinic  07/18/24     Catarina Cutler  was referred to the Clinical Pharmacy Team for her osteoporosis management and Prolia initiation.    Referring Provider: Edilia Alex MD   _______________________________________________________________________  OSTEOPOROSIS ASSESSMENT    CURRENT PHARMACOTHERAPY FOR OSTEOPOROSIS  - Prolia 60 mg --- admin'd on 2/5/24    HISTORICAL PHARMACOTHERAPY FOR OSTEOPOROSIS  - Alendronate 70 mg weekly     === 07/18/23 ===    DEXA BONE DENSITY     RELEVANT LAB RESULTS  Lab Results   Component Value Date    CREATININE 0.78 02/26/2024    BUN 13 02/26/2024     02/26/2024    K 4.5 02/26/2024     02/26/2024    CO2 29 02/26/2024      Lab Results   Component Value Date    CHOL 144 07/18/2023    CHOL 145 07/15/2022    CHOL 138 06/25/2021     Lab Results   Component Value Date    HDL 72.0 07/18/2023    HDL 60.4 07/15/2022    HDL 59.2 06/25/2021     No results found for: \"LDLCALC\"  Lab Results   Component Value Date    TRIG 74 07/18/2023    TRIG 82 07/15/2022    TRIG 62 06/25/2021     No components found for: \"CHOLHDL\"     ADDITIONAL INFORMATION  Calcium/Vitamin D therapy: Yes  _______________________________________________________________________  RECOMMENDATIONS/PLAN  1. Continue taking Prolia 60 mg under the skin once every 6 months.   2. CONTINUE Calcium and Vitamin D supplement  3. Prescription to  [facility]ty Pharmacy for assistance with coverage and medication will be delivered to office for administration.   4. Patient to complete CMP within 30 days prior to Prolia injection.     Clinical Pharmacist follow-up: as needed  Type of Encounter: Virtual    Thank you,  Fab Magallanes, PharmD    Continue all meds under the continuation of care with the referring provider and clinical pharmacy team.    "

## 2024-07-19 ENCOUNTER — SPECIALTY PHARMACY (OUTPATIENT)
Dept: PHARMACY | Facility: CLINIC | Age: 89
End: 2024-07-19

## 2024-07-19 LAB
EST. AVERAGE GLUCOSE BLD GHB EST-MCNC: 114 MG/DL
HBA1C MFR BLD: 5.6 %

## 2024-07-19 ASSESSMENT — ACTIVITIES OF DAILY LIVING (ADL)
MANAGING_FINANCES: INDEPENDENT
GROCERY_SHOPPING: INDEPENDENT
TAKING_MEDICATION: INDEPENDENT
DRESSING: INDEPENDENT
BATHING: INDEPENDENT
DOING_HOUSEWORK: INDEPENDENT

## 2024-08-20 ENCOUNTER — APPOINTMENT (OUTPATIENT)
Dept: PHARMACY | Facility: HOSPITAL | Age: 89
End: 2024-08-20
Payer: MEDICARE

## 2024-08-20 ENCOUNTER — TELEPHONE (OUTPATIENT)
Dept: PRIMARY CARE | Facility: CLINIC | Age: 89
End: 2024-08-20

## 2024-08-20 NOTE — TELEPHONE ENCOUNTER
Patient LVM, said she was supposed to get a call from  Pharm for the video appt, but she never got the call (after waiting 30 minutes).  She called them and they told her it had been canceled, that she didn't need to have the appt in the 1st place, that it's only to deal with people who have a problem with the med or problem with paying for it.      What now?  How does she get the Prolia inj?      Can you assist with this?  She can be reached at [phone].

## 2024-08-30 NOTE — TELEPHONE ENCOUNTER
"I reached out to the patient again to try to get patient to schedule her Prolia injection after verifying her benefits and initiating past process of process of Prolia injections.  She hung on me last week and stating everything is too complicated and hung up on me.    This is the patient we discussed regarding her experience with our new way the office started in June for Prolia injections to the clinical pharmacy.      I informed Ms. Cutler that you ok's using the BMP results performed 07/18/24 IF SHE RECEIVED THE INJECTION THE WEEK OF 09/01/24-09/07/24 so she did not have to be redrawn.      She was informed the the pharmacy she would at least be responsible for $50 with pt assistance and is still too much out of pocket at this time and that it is a complicated time for her.    Ms. Cutler states that although she is appreciative of our follow up and her care-he is still not going to continue with her Prolia injections at this time and \"will try to keep herself healthy and will see us next July\".       I messaged pharmacist Ana María Birmingham a request to cancel the referral to the clinical pharmacy for Prolia.  "

## 2024-09-19 DIAGNOSIS — E78.5 HYPERLIPIDEMIA, UNSPECIFIED HYPERLIPIDEMIA TYPE: ICD-10-CM

## 2024-09-19 RX ORDER — ATORVASTATIN CALCIUM 20 MG/1
20 TABLET, FILM COATED ORAL DAILY
Qty: 90 TABLET | Refills: 0 | Status: SHIPPED | OUTPATIENT
Start: 2024-09-19

## 2024-12-09 ENCOUNTER — APPOINTMENT (OUTPATIENT)
Dept: RADIOLOGY | Facility: HOSPITAL | Age: 89
End: 2024-12-09
Payer: MEDICARE

## 2024-12-09 ENCOUNTER — HOSPITAL ENCOUNTER (EMERGENCY)
Facility: HOSPITAL | Age: 89
Discharge: OTHER INSTITUTION | End: 2024-12-09
Attending: INTERNAL MEDICINE
Payer: MEDICARE

## 2024-12-09 ENCOUNTER — APPOINTMENT (OUTPATIENT)
Dept: CARDIOLOGY | Facility: HOSPITAL | Age: 89
End: 2024-12-09
Payer: MEDICARE

## 2024-12-09 VITALS
HEIGHT: 61 IN | DIASTOLIC BLOOD PRESSURE: 70 MMHG | TEMPERATURE: 97.8 F | OXYGEN SATURATION: 94 % | WEIGHT: 90 LBS | RESPIRATION RATE: 15 BRPM | SYSTOLIC BLOOD PRESSURE: 115 MMHG | BODY MASS INDEX: 16.99 KG/M2 | HEART RATE: 57 BPM

## 2024-12-09 DIAGNOSIS — S22.050A: ICD-10-CM

## 2024-12-09 DIAGNOSIS — I71.20 THORACIC AORTIC ANEURYSM (TAA), UNSPECIFIED PART, UNSPECIFIED WHETHER RUPTURED (CMS-HCC): Primary | ICD-10-CM

## 2024-12-09 LAB
ALBUMIN SERPL BCP-MCNC: 3.8 G/DL (ref 3.4–5)
ALP SERPL-CCNC: 108 U/L (ref 33–136)
ALT SERPL W P-5'-P-CCNC: 17 U/L (ref 7–45)
ANION GAP SERPL CALC-SCNC: 13 MMOL/L (ref 10–20)
APPEARANCE UR: CLEAR
AST SERPL W P-5'-P-CCNC: 28 U/L (ref 9–39)
BASOPHILS # BLD AUTO: 0.02 X10*3/UL (ref 0–0.1)
BASOPHILS NFR BLD AUTO: 0.2 %
BILIRUB SERPL-MCNC: 0.8 MG/DL (ref 0–1.2)
BILIRUB UR STRIP.AUTO-MCNC: NEGATIVE MG/DL
BUN SERPL-MCNC: 12 MG/DL (ref 6–23)
CALCIUM SERPL-MCNC: 8.3 MG/DL (ref 8.6–10.3)
CARDIAC TROPONIN I PNL SERPL HS: 33 NG/L (ref 0–13)
CARDIAC TROPONIN I PNL SERPL HS: 37 NG/L (ref 0–13)
CHLORIDE SERPL-SCNC: 103 MMOL/L (ref 98–107)
CO2 SERPL-SCNC: 27 MMOL/L (ref 21–32)
COLOR UR: ABNORMAL
CREAT SERPL-MCNC: 0.66 MG/DL (ref 0.5–1.05)
EGFRCR SERPLBLD CKD-EPI 2021: 85 ML/MIN/1.73M*2
EOSINOPHIL # BLD AUTO: 0.01 X10*3/UL (ref 0–0.4)
EOSINOPHIL NFR BLD AUTO: 0.1 %
ERYTHROCYTE [DISTWIDTH] IN BLOOD BY AUTOMATED COUNT: 12.2 % (ref 11.5–14.5)
GLUCOSE SERPL-MCNC: 111 MG/DL (ref 74–99)
GLUCOSE UR STRIP.AUTO-MCNC: NORMAL MG/DL
HCT VFR BLD AUTO: 41.2 % (ref 36–46)
HGB BLD-MCNC: 13.6 G/DL (ref 12–16)
IMM GRANULOCYTES # BLD AUTO: 0.04 X10*3/UL (ref 0–0.5)
IMM GRANULOCYTES NFR BLD AUTO: 0.4 % (ref 0–0.9)
KETONES UR STRIP.AUTO-MCNC: ABNORMAL MG/DL
LEUKOCYTE ESTERASE UR QL STRIP.AUTO: NEGATIVE
LYMPHOCYTES # BLD AUTO: 0.56 X10*3/UL (ref 0.8–3)
LYMPHOCYTES NFR BLD AUTO: 5.8 %
MCH RBC QN AUTO: 30.9 PG (ref 26–34)
MCHC RBC AUTO-ENTMCNC: 33 G/DL (ref 32–36)
MCV RBC AUTO: 94 FL (ref 80–100)
MONOCYTES # BLD AUTO: 0.38 X10*3/UL (ref 0.05–0.8)
MONOCYTES NFR BLD AUTO: 3.9 %
MUCOUS THREADS #/AREA URNS AUTO: NORMAL /LPF
NEUTROPHILS # BLD AUTO: 8.66 X10*3/UL (ref 1.6–5.5)
NEUTROPHILS NFR BLD AUTO: 89.6 %
NITRITE UR QL STRIP.AUTO: NEGATIVE
NRBC BLD-RTO: 0 /100 WBCS (ref 0–0)
PH UR STRIP.AUTO: 6.5 [PH]
PLATELET # BLD AUTO: 131 X10*3/UL (ref 150–450)
POTASSIUM SERPL-SCNC: 4 MMOL/L (ref 3.5–5.3)
PROT SERPL-MCNC: 6.5 G/DL (ref 6.4–8.2)
PROT UR STRIP.AUTO-MCNC: ABNORMAL MG/DL
RBC # BLD AUTO: 4.4 X10*6/UL (ref 4–5.2)
RBC # UR STRIP.AUTO: NEGATIVE /UL
RBC #/AREA URNS AUTO: NORMAL /HPF
SODIUM SERPL-SCNC: 139 MMOL/L (ref 136–145)
SP GR UR STRIP.AUTO: 1.02
SQUAMOUS #/AREA URNS AUTO: NORMAL /HPF
UROBILINOGEN UR STRIP.AUTO-MCNC: NORMAL MG/DL
WBC # BLD AUTO: 9.7 X10*3/UL (ref 4.4–11.3)
WBC #/AREA URNS AUTO: NORMAL /HPF

## 2024-12-09 PROCEDURE — 71275 CT ANGIOGRAPHY CHEST: CPT | Performed by: RADIOLOGY

## 2024-12-09 PROCEDURE — 71045 X-RAY EXAM CHEST 1 VIEW: CPT | Mod: FOREIGN READ | Performed by: RADIOLOGY

## 2024-12-09 PROCEDURE — 81001 URINALYSIS AUTO W/SCOPE: CPT | Performed by: INTERNAL MEDICINE

## 2024-12-09 PROCEDURE — 84484 ASSAY OF TROPONIN QUANT: CPT | Performed by: INTERNAL MEDICINE

## 2024-12-09 PROCEDURE — 71045 X-RAY EXAM CHEST 1 VIEW: CPT

## 2024-12-09 PROCEDURE — 72131 CT LUMBAR SPINE W/O DYE: CPT | Mod: FOREIGN READ | Performed by: RADIOLOGY

## 2024-12-09 PROCEDURE — 36415 COLL VENOUS BLD VENIPUNCTURE: CPT | Performed by: INTERNAL MEDICINE

## 2024-12-09 PROCEDURE — 72131 CT LUMBAR SPINE W/O DYE: CPT

## 2024-12-09 PROCEDURE — 99291 CRITICAL CARE FIRST HOUR: CPT | Mod: 25 | Performed by: INTERNAL MEDICINE

## 2024-12-09 PROCEDURE — 96374 THER/PROPH/DIAG INJ IV PUSH: CPT | Mod: 59

## 2024-12-09 PROCEDURE — 85025 COMPLETE CBC W/AUTO DIFF WBC: CPT | Performed by: INTERNAL MEDICINE

## 2024-12-09 PROCEDURE — 96375 TX/PRO/DX INJ NEW DRUG ADDON: CPT | Mod: 59

## 2024-12-09 PROCEDURE — 2500000001 HC RX 250 WO HCPCS SELF ADMINISTERED DRUGS (ALT 637 FOR MEDICARE OP): Performed by: INTERNAL MEDICINE

## 2024-12-09 PROCEDURE — 96376 TX/PRO/DX INJ SAME DRUG ADON: CPT | Mod: 59

## 2024-12-09 PROCEDURE — 72128 CT CHEST SPINE W/O DYE: CPT

## 2024-12-09 PROCEDURE — 93005 ELECTROCARDIOGRAM TRACING: CPT

## 2024-12-09 PROCEDURE — 2500000004 HC RX 250 GENERAL PHARMACY W/ HCPCS (ALT 636 FOR OP/ED): Performed by: INTERNAL MEDICINE

## 2024-12-09 PROCEDURE — 71275 CT ANGIOGRAPHY CHEST: CPT

## 2024-12-09 PROCEDURE — 2550000001 HC RX 255 CONTRASTS: Performed by: INTERNAL MEDICINE

## 2024-12-09 PROCEDURE — 72128 CT CHEST SPINE W/O DYE: CPT | Mod: FOREIGN READ | Performed by: RADIOLOGY

## 2024-12-09 PROCEDURE — 74174 CTA ABD&PLVS W/CONTRAST: CPT | Performed by: RADIOLOGY

## 2024-12-09 PROCEDURE — 84132 ASSAY OF SERUM POTASSIUM: CPT | Performed by: INTERNAL MEDICINE

## 2024-12-09 RX ORDER — LABETALOL HYDROCHLORIDE 5 MG/ML
10 INJECTION, SOLUTION INTRAVENOUS ONCE AS NEEDED
Status: COMPLETED | OUTPATIENT
Start: 2024-12-09 | End: 2024-12-09

## 2024-12-09 RX ORDER — MORPHINE SULFATE 4 MG/ML
4 INJECTION, SOLUTION INTRAMUSCULAR; INTRAVENOUS ONCE
Status: COMPLETED | OUTPATIENT
Start: 2024-12-09 | End: 2024-12-09

## 2024-12-09 RX ORDER — KETOROLAC TROMETHAMINE 30 MG/ML
15 INJECTION, SOLUTION INTRAMUSCULAR; INTRAVENOUS ONCE
Status: COMPLETED | OUTPATIENT
Start: 2024-12-09 | End: 2024-12-09

## 2024-12-09 RX ORDER — LABETALOL HYDROCHLORIDE 5 MG/ML
20 INJECTION, SOLUTION INTRAVENOUS ONCE
Status: COMPLETED | OUTPATIENT
Start: 2024-12-09 | End: 2024-12-09

## 2024-12-09 RX ORDER — CYCLOBENZAPRINE HCL 5 MG
5 TABLET ORAL ONCE
Status: COMPLETED | OUTPATIENT
Start: 2024-12-09 | End: 2024-12-09

## 2024-12-09 RX ORDER — LABETALOL HYDROCHLORIDE 5 MG/ML
10 INJECTION, SOLUTION INTRAVENOUS ONCE
Status: COMPLETED | OUTPATIENT
Start: 2024-12-09 | End: 2024-12-09

## 2024-12-09 ASSESSMENT — PAIN DESCRIPTION - LOCATION
LOCATION: BACK

## 2024-12-09 ASSESSMENT — PAIN SCALES - GENERAL
PAINLEVEL_OUTOF10: 10 - WORST POSSIBLE PAIN
PAINLEVEL_OUTOF10: 10 - WORST POSSIBLE PAIN
PAINLEVEL_OUTOF10: 0 - NO PAIN
PAINLEVEL_OUTOF10: 10 - WORST POSSIBLE PAIN

## 2024-12-09 ASSESSMENT — PAIN DESCRIPTION - PAIN TYPE
TYPE: ACUTE PAIN
TYPE: ACUTE PAIN

## 2024-12-09 ASSESSMENT — PAIN - FUNCTIONAL ASSESSMENT
PAIN_FUNCTIONAL_ASSESSMENT: 0-10

## 2024-12-09 ASSESSMENT — PAIN DESCRIPTION - ORIENTATION
ORIENTATION: RIGHT;LEFT;UPPER;MID
ORIENTATION: LOWER;MID;UPPER
ORIENTATION: LOWER;MID;UPPER

## 2024-12-09 NOTE — ED TRIAGE NOTES
Pt to ED from home for back pain without injury. Pt endorses pain between her shoulder blades down to her lower back that began this morning at 2AM. Pt endorses the pain woke her up out of her sleep. EMS gave 50mcg of Fetanyl and 4mg of Zofran prior to arrival with no changes in pain.

## 2024-12-09 NOTE — ED PROVIDER NOTES
HPI     CC: Back Pain     HPI: Catarina Cutler is a 89 y.o. female with a history of glaucoma, HLD, presents with atraumatic back pain. Patient states that she woke up this morning with pain in her back.  She states she is not sure where the pain is localized, states it feels like it is all over.  It is nonradiating.  Denies neck pain or headache.  She denies any numbness/tingling, weakness, bowel or bladder changes, fever, chills, urinary symptoms.  She has never had anything like this before.  It hurts to move.  She denies any anterior chest pain or abdominal pain, but did have an episode of vomiting.  She denies any history of trauma.  She received something en route which did not help her pain.    ROS: 10-point review of systems was performed and is otherwise negative except as noted in HPI.    Limitations to history: N/A    Independent Historians: N/A    External Records Reviewed: Outpatient notes in EMR    Past Medical History: Noncontributory except per HPI     Past Surgical History: Noncontributory except per HPI     Family History: Reviewed and noncontributory     Social History:  Denies tobacco.  Drinks 2 glasses of wine per night. Denies illicit drugs.    Social Determinants Affecting Care: N/A    Allergies   Allergen Reactions    Penicillins Other, Unknown, Swelling and Rash       Home Meds:   Current Outpatient Medications   Medication Instructions    aspirin 81 mg, oral, Daily RT    atorvastatin (LIPITOR) 20 mg, oral, Daily    multivit-min/ferrous fumarate (MULTI VITAMIN ORAL) oral, Daily    Prolia 60 mg, subcutaneous, Every 6 months        Physical Exam     ED Triage Vitals [12/09/24 1335]   Temperature Heart Rate Respirations BP   36.6 °C (97.8 °F) 73 16 113/76      Pulse Ox Temp Source Heart Rate Source Patient Position   94 % Oral -- --      BP Location FiO2 (%)     -- --         Heart Rate:  [58-85]   Temperature:  [36.6 °C (97.8 °F)]   Respirations:  [15-21]   BP: (113-169)/()   Height:   "[154.9 cm (5' 1\")]   Weight:  [40.8 kg (90 lb)]   Pulse Ox:  [88 %-100 %]      Physical Exam  Vitals and nursing note reviewed.     CONSTITUTIONAL: Uncomfortable appearing, well nourished, in no acute distress.   HENMT: Head atraumatic. Airway patent. Nasal mucosa clear. Mouth with normal mucosa, clear oropharynx. Uvula midline. Neck supple.    EYES: Clear bilaterally, pupils equally round and reactive to light.   CARDIOVASCULAR: Normal rate, regular rhythm.  Heart sounds S1, S2.  No murmurs, rubs or gallops. Normal pulses. Capillary refill < 2 sec.   RESPIRATORY: No increased work of breathing. Breath sounds clear and equal bilaterally.  GASTROINTESTINAL: Abdomen soft, non-distended, non-tender. No rebound, no guarding. Normal bowel sounds. No palpable masses.  GENITOURINARY:  No CVA tenderness.  MUSCULOSKELETAL: Spine appears normal, range of motion is not limited, diffuse tenderness throughout midline thoracic spine.  No paraspinal TTP.  No other midline spinal tenderness of the cervical or lumbar spine.  No edema.   NEUROLOGICAL: AAO x3. CN II-XII intact. 5/5 strength and SILT UEs/LEs.  2+ patellar reflexes.    PSYCHIATRIC: Normal mood and affect.  HEME/LYMPH: No adenopathy or splenomegaly.    Diagnostic Results      ECG: ECGs read and interpreted by me. See ED Course, below, for interpretation.    Labs Reviewed   CBC WITH AUTO DIFFERENTIAL - Abnormal       Result Value    WBC 9.7      nRBC 0.0      RBC 4.40      Hemoglobin 13.6      Hematocrit 41.2      MCV 94      MCH 30.9      MCHC 33.0      RDW 12.2      Platelets 131 (*)     Neutrophils % 89.6      Immature Granulocytes %, Automated 0.4      Lymphocytes % 5.8      Monocytes % 3.9      Eosinophils % 0.1      Basophils % 0.2      Neutrophils Absolute 8.66 (*)     Immature Granulocytes Absolute, Automated 0.04      Lymphocytes Absolute 0.56 (*)     Monocytes Absolute 0.38      Eosinophils Absolute 0.01      Basophils Absolute 0.02     COMPREHENSIVE METABOLIC " PANEL - Abnormal    Glucose 111 (*)     Sodium 139      Potassium 4.0      Chloride 103      Bicarbonate 27      Anion Gap 13      Urea Nitrogen 12      Creatinine 0.66      eGFR 85      Calcium 8.3 (*)     Albumin 3.8      Alkaline Phosphatase 108      Total Protein 6.5      AST 28      Bilirubin, Total 0.8      ALT 17     SERIAL TROPONIN-INITIAL - Abnormal    Troponin I, High Sensitivity 37 (*)     Narrative:     Less than 99th percentile of normal range cutoff-  Female and children under 18 years old <14 ng/L; Male <21 ng/L: Negative  Repeat testing should be performed if clinically indicated.     Female and children under 18 years old 14-50 ng/L; Male 21-50 ng/L:  Consistent with possible cardiac damage and possible increased clinical   risk. Serial measurements may help to assess extent of myocardial damage.     >50 ng/L: Consistent with cardiac damage, increased clinical risk and  myocardial infarction. Serial measurements may help assess extent of   myocardial damage.      NOTE: Children less than 1 year old may have higher baseline troponin   levels and results should be interpreted in conjunction with the overall   clinical context.     NOTE: Troponin I testing is performed using a different   testing methodology at [facility] than at other   [facility]. Direct result comparisons should only   be made within the same method.   URINALYSIS WITH REFLEX CULTURE AND MICROSCOPIC - Abnormal    Color, Urine Light-Yellow      Appearance, Urine Clear      Specific Gravity, Urine 1.020      pH, Urine 6.5      Protein, Urine 10 (TRACE)      Glucose, Urine Normal      Blood, Urine NEGATIVE      Ketones, Urine 60 (2+) (*)     Bilirubin, Urine NEGATIVE      Urobilinogen, Urine Normal      Nitrite, Urine NEGATIVE      Leukocyte Esterase, Urine NEGATIVE     SERIAL TROPONIN, 1 HOUR - Abnormal    Troponin I, High Sensitivity 33 (*)     Narrative:     Less than 99th percentile of normal range  cutoff-  Female and children under 18 years old <14 ng/L; Male <21 ng/L: Negative  Repeat testing should be performed if clinically indicated.     Female and children under 18 years old 14-50 ng/L; Male 21-50 ng/L:  Consistent with possible cardiac damage and possible increased clinical   risk. Serial measurements may help to assess extent of myocardial damage.     >50 ng/L: Consistent with cardiac damage, increased clinical risk and  myocardial infarction. Serial measurements may help assess extent of   myocardial damage.      NOTE: Children less than 1 year old may have higher baseline troponin   levels and results should be interpreted in conjunction with the overall   clinical context.     NOTE: Troponin I testing is performed using a different   testing methodology at [facility] than at other   [facility]. Direct result comparisons should only   be made within the same method.   TROPONIN SERIES- (INITIAL, 1 HR)    Narrative:     The following orders were created for panel order Troponin I Series, High Sensitivity (0, 1 HR).  Procedure                               Abnormality         Status                     ---------                               -----------         ------                     Troponin I, High Sensiti...[[id number]]  Abnormal            Final result               Troponin, High Sensitivi...[[id number]]  Abnormal            Final result                 Please view results for these tests on the individual orders.   URINALYSIS WITH REFLEX CULTURE AND MICROSCOPIC    Narrative:     The following orders were created for panel order Urinalysis with Reflex Culture and Microscopic.  Procedure                               Abnormality         Status                     ---------                               -----------         ------                     Urinalysis with Reflex C...[[id number]]  Abnormal            Final result               Extra Urine Gray Tube[[id number]]                                                          Please view results for these tests on the individual orders.   EXTRA URINE GRAY TUBE   URINALYSIS MICROSCOPIC WITH REFLEX CULTURE    WBC, Urine 1-5      RBC, Urine NONE      Squamous Epithelial Cells, Urine 1-9 (SPARSE)      Mucus, Urine FEW           CT angio chest abdomen pelvis   Final Result   VASCULAR:   1. 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 Additional postsurgical changes of right carotid   artery to right subclavian artery bypass synthetic graft. There is   suggestion with a dissection flap within this graft.   2. Significant interval enlargement of aneurysmal sac in the   ascending and descending thoracic aorta. There is contrast pooling   into the excluded aneurysmal sac, concerning for an endoleak within   limitations of a two phase study. In addition, the descending   thoracic aneurysmal sac is seen compressing the left atrium and left   ventricle. Vascular surgery consultation is recommended.   3. Interval enlargement in fusiform abdominal aortic aneurysm now   measuring up to 3.2 cm. Multifocal ulcerating atherosclerotic plaque   throughout the abdominal aorta.        CHEST ABDOMEN PELVIS:   1. New atrophic appearance of the right kidney in the setting of   severe segmental narrowing of the dominant right renal artery at its   origin is most compatible with ischemic injury.   2. New spiculated pulmonary nodule in the superior segment of left   lower lobe measuring 1.9 cm, concerning for malignancy. Consider   tissue sampling for further evaluation.   3. Additional incidental non-acute findings as detailed above.             I personally reviewed the images/study and I agree with the findings   as stated by Dr. Patel Curry. This study was interpreted at [facility]  [place], [place].        MACRO:   Patel Curry discussed the significance and urgency of this critical   finding by epic secure chat with  CARLY PHILIPPE on 12/9/2024 at   5:51 pm.  (**-RCF-**) Findings:  See findings.        .        Signed by: Felipe Rajput 12/9/2024 6:17 PM   Dictation workstation:   [id number]      CT lumbar spine wo IV contrast   Final Result   No acute lumbar fracture.   Fusiform aneurysm distal descending thoracic aorta is 6.4 x 5.3 cm.    Recommend cardiovascular surgery follow-up.   Disc osteophyte complex L5/S1 with mild central canal stenosis.   Mild right-sided L3/4 neural foramen stenosis and mild left-sided   L5/S1 neural foramen stenosis due to osteophytes.   Signed by Klever Wright MD      CT thoracic spine wo IV contrast   Final Result   1. There is mild compression of the superior endplate of T5. The age   of this is unclear.   2. There is a spiculated nodule at the left lower lobe. This is   suspicious for malignancy. Recommend chest CT for further evaluation.   3. There is aneurysmal dilatation of the entire thoracic aorta.   Recommend CTA for further evaluation.   Signed by David Nicole MD      XR chest 1 view   Final Result   1. No acute pulmonary pathology.   2. There is a 1.2 cm density in the left midlung. There is also a 1.3   cm density in the left lung base, though this could represent the left   nipple. These could represent masses or nodules. Recommend chest CT   for further evaluation.   Signed by David Nicole MD                    Somis Coma Scale Score: 15                  Procedure  Critical Care    Performed by: Carly Philippe MD  Authorized by: Carly Philippe MD    Critical care provider statement:     Critical care time (minutes):  45    Critical care time was exclusive of:  Separately billable procedures and treating other patients and teaching time    Critical care was necessary to treat or prevent imminent or  life-threatening deterioration of the following conditions: aortic aneurysm.    Critical care was time spent personally by me on the following activities:  Development of treatment plan with patient or surrogate, discussions with consultants, evaluation of patient's response to treatment, examination of patient, obtaining history from patient or surrogate, ordering and performing treatments and interventions, ordering and review of laboratory studies, pulse oximetry, ordering and review of radiographic studies, re-evaluation of patient's condition and review of old charts    Care discussed with: admitting provider        ED Course & MDM   Assessment/Plan:   Catarina Cutler is a 89 y.o. female with a history of glaucoma, HLD, presents with atraumatic thoracic back pain.  Patient has diffuse tenderness throughout her thoracic spine on exam.  She has no anterior chest pain to suspect ACS or dissection but will screen for atypical cardiac pathology with ECG, troponin, chest x-ray.  Given her age, risk factors for osteoporosis, will check a CT of the thoracic spine to rule out occult fracture.  No risk factors for occult cord pathology such as spinal epidural hematoma or cord compression and she is neurovascularly intact pointing away from this.  Workup was initiated with ECG, labs, chest x-ray and CT of the thoracic spine. Treatment was initiated with Toradol and Flexeril. See below for details of ED course and ultimate disposition.    Medications   ketorolac (Toradol) injection 15 mg (15 mg intravenous Given 12/9/24 1439)   cyclobenzaprine (Flexeril) tablet 5 mg (5 mg oral Given 12/9/24 1439)   morphine injection 4 mg (4 mg intravenous Given 12/9/24 1534)   iohexol (OMNIPaque) 350 mg iodine/mL solution 90 mL (90 mL intravenous Given 12/9/24 1617)   labetaloL (Normodyne,Trandate) injection 10 mg (10 mg intravenous Given 12/9/24 2128)   labetaloL (Normodyne,Trandate) injection 10 mg (10 mg intravenous Given 12/9/24  2206)        ED Course as of 12/09/24 2217   Mon Dec 09, 2024   1426 ECG read interpreted by me.  Sinus rhythm marked sinus arrhythmia, rate 72.  Normal axis.  Normal intervals.  No ST or T wave derangements. [CG]   1448 CXR 1. No acute pulmonary pathology.  2. There is a 1.2 cm density in the left midlung. There is also a 1.3 cm density in the left lung base, though this could represent the left  nipple. These could represent masses or  odules. Recommend chest CT for further evaluation.   [CG]   1450 CT TS 1. There is mild compression of the superior endplate of T5. The age of this is unclear.  2. There is a spiculated nodule at the left lower lobe. This is suspicious for malignancy. Recommend chest CT for further evaluation.  3. There is aneurysmal dilatation of the entire thoracic aorta. Recommend CTA for further evaluation.   [CG]   1505 CTA ordered to assess for malignancy, aortic dissection [CG]   1628 Trops mildly elevated but stable.  Labs otherwise unremarkable except for mild thrombocytopenia 131. [CG]   1715 CT LS No acute lumbar fracture. Fusiform aneurysm distal descending thoracic aorta is 6.4 x 5.3 cm.   Recommend cardiovascular surgery follow-up. Disc osteophyte complex L5/S1 with mild central canal stenosis. Mild right-sided L3/4 neural foramen stenosis and mild left-sided L5/S1 neural foramen stenosis due to osteophytes. [CG]   1814 Per radiology pt's thoracic aneurysm is significantly enlarged with concerns for endoleak. Vasc surgery consult is highly recommended  Pt also has R carotid to R subclavian artery bypass graft, there looks to be a dissection flap within this graft   [CG]   1819 Per patient she feels better but is still in pain. She states she had some type of aneursym repaired 9 years ago by open heart surgery by Dr. Humaira augusteGallaghersimeon [CG]   1838 Spoke with Dr. Velarde vascular surgery who recommends transfer to CICU through aortic activation [CG]   1904 Spoke with aortic team who agrees  to transfer to CICU for expedited evaluation. She recommends labetalol for goal , HR <60.  [CG]      ED Course User Index  [CG] Carly Philippe MD         Diagnoses as of 12/09/24 2217   Thoracic aortic aneurysm (TAA), unspecified part, unspecified whether ruptured (CMS-[facility])   Closed wedge compression fracture of T5 vertebra, initial encounter (Multi)       Disposition:   Transferred. The patient was transferred to [facility] CICU for further management. Risks/benefits of transfer discussed. EMTALA form completed. Patient was transferred without incident.     ED Prescriptions    None         Carly Philippe MD  EM/IM/Peds    This note was dictated by speech recognition. Minor errors in transcription may be present.     Carly Philippe MD  12/09/24 7625

## 2024-12-10 ENCOUNTER — HOSPITAL ENCOUNTER (INPATIENT)
Facility: HOSPITAL | Age: 89
LOS: 2 days | Discharge: HOME | DRG: 315 | End: 2024-12-12
Attending: INTERNAL MEDICINE | Admitting: INTERNAL MEDICINE
Payer: MEDICARE

## 2024-12-10 ENCOUNTER — APPOINTMENT (OUTPATIENT)
Dept: CARDIOLOGY | Facility: HOSPITAL | Age: 89
DRG: 315 | End: 2024-12-10
Payer: MEDICARE

## 2024-12-10 DIAGNOSIS — I71.9 AORTIC ANEURYSM, UNSPECIFIED PORTION OF AORTA, UNSPECIFIED WHETHER RUPTURED (CMS-HCC): Primary | ICD-10-CM

## 2024-12-10 DIAGNOSIS — I79.0 ANEURYSM OF AORTA IN DISEASES CLASSIFIED ELSEWHERE: ICD-10-CM

## 2024-12-10 DIAGNOSIS — I25.10 CORONARY ARTERY DISEASE INVOLVING NATIVE HEART WITHOUT ANGINA PECTORIS, UNSPECIFIED VESSEL OR LESION TYPE: ICD-10-CM

## 2024-12-10 DIAGNOSIS — E78.5 HYPERLIPIDEMIA, UNSPECIFIED HYPERLIPIDEMIA TYPE: ICD-10-CM

## 2024-12-10 DIAGNOSIS — I71.60 THORACOABDOMINAL AORTIC ANEURYSM (TAAA) WITHOUT RUPTURE, UNSPECIFIED PART (CMS-HCC): ICD-10-CM

## 2024-12-10 LAB
ABO GROUP (TYPE) IN BLOOD: NORMAL
ALBUMIN SERPL BCP-MCNC: 3.8 G/DL (ref 3.4–5)
ALP SERPL-CCNC: 104 U/L (ref 33–136)
ALT SERPL W P-5'-P-CCNC: 17 U/L (ref 7–45)
ANION GAP SERPL CALC-SCNC: 14 MMOL/L (ref 10–20)
ANTIBODY SCREEN: NORMAL
AORTIC VALVE PEAK VELOCITY: 1.21 M/S
APTT PPP: 23 SECONDS (ref 27–38)
AST SERPL W P-5'-P-CCNC: 29 U/L (ref 9–39)
ATRIAL RATE: 56 BPM
AV PEAK GRADIENT: 6 MMHG
AVA (PEAK VEL): 1.47 CM2
BILIRUB SERPL-MCNC: 0.9 MG/DL (ref 0–1.2)
BUN SERPL-MCNC: 12 MG/DL (ref 6–23)
CALCIUM SERPL-MCNC: 8.6 MG/DL (ref 8.6–10.6)
CARDIAC TROPONIN I PNL SERPL HS: 27 NG/L (ref 0–34)
CHLORIDE SERPL-SCNC: 101 MMOL/L (ref 98–107)
CO2 SERPL-SCNC: 26 MMOL/L (ref 21–32)
CREAT SERPL-MCNC: 0.79 MG/DL (ref 0.5–1.05)
EGFRCR SERPLBLD CKD-EPI 2021: 73 ML/MIN/1.73M*2
EJECTION FRACTION APICAL 4 CHAMBER: 72.5
EJECTION FRACTION: 68 %
ERYTHROCYTE [DISTWIDTH] IN BLOOD BY AUTOMATED COUNT: 12.3 % (ref 11.5–14.5)
GLUCOSE SERPL-MCNC: 118 MG/DL (ref 74–99)
HCT VFR BLD AUTO: 39.1 % (ref 36–46)
HGB BLD-MCNC: 13.2 G/DL (ref 12–16)
INR PPP: 1 (ref 0.9–1.1)
LACTATE SERPL-SCNC: 0.9 MMOL/L (ref 0.4–2)
LACTATE SERPL-SCNC: 0.9 MMOL/L (ref 0.4–2)
LACTATE SERPL-SCNC: 2.4 MMOL/L (ref 0.4–2)
LEFT ATRIUM VOLUME AREA LENGTH INDEX BSA: 19.3 ML/M2
LEFT VENTRICLE INTERNAL DIMENSION DIASTOLE: 3.6 CM (ref 3.5–6)
LEFT VENTRICULAR OUTFLOW TRACT DIAMETER: 1.7 CM
MAGNESIUM SERPL-MCNC: 2.1 MG/DL (ref 1.6–2.4)
MCH RBC QN AUTO: 30.8 PG (ref 26–34)
MCHC RBC AUTO-ENTMCNC: 33.8 G/DL (ref 32–36)
MCV RBC AUTO: 91 FL (ref 80–100)
MITRAL VALVE E/A RATIO: 1.22
NRBC BLD-RTO: 0 /100 WBCS (ref 0–0)
P AXIS: 85 DEGREES
P OFFSET: 215 MS
P ONSET: 162 MS
PLATELET # BLD AUTO: 174 X10*3/UL (ref 150–450)
POTASSIUM SERPL-SCNC: 4.1 MMOL/L (ref 3.5–5.3)
PR INTERVAL: 130 MS
PROT SERPL-MCNC: 6.6 G/DL (ref 6.4–8.2)
PROTHROMBIN TIME: 11.7 SECONDS (ref 9.8–12.8)
Q ONSET: 227 MS
QRS COUNT: 9 BEATS
QRS DURATION: 90 MS
QT INTERVAL: 504 MS
QTC CALCULATION(BAZETT): 486 MS
QTC FREDERICIA: 493 MS
R AXIS: 70 DEGREES
RBC # BLD AUTO: 4.29 X10*6/UL (ref 4–5.2)
RH FACTOR (ANTIGEN D): NORMAL
RIGHT VENTRICLE FREE WALL PEAK S': 13.2 CM/S
RIGHT VENTRICLE PEAK SYSTOLIC PRESSURE: 30.9 MMHG
SODIUM SERPL-SCNC: 137 MMOL/L (ref 136–145)
T AXIS: 92 DEGREES
T OFFSET: 479 MS
TRICUSPID ANNULAR PLANE SYSTOLIC EXCURSION: 2.1 CM
VENTRICULAR RATE: 56 BPM
WBC # BLD AUTO: 10.2 X10*3/UL (ref 4.4–11.3)

## 2024-12-10 PROCEDURE — 93010 ELECTROCARDIOGRAM REPORT: CPT | Performed by: INTERNAL MEDICINE

## 2024-12-10 PROCEDURE — 93306 TTE W/DOPPLER COMPLETE: CPT | Performed by: INTERNAL MEDICINE

## 2024-12-10 PROCEDURE — 2500000004 HC RX 250 GENERAL PHARMACY W/ HCPCS (ALT 636 FOR OP/ED)

## 2024-12-10 PROCEDURE — 99223 1ST HOSP IP/OBS HIGH 75: CPT | Performed by: PHYSICIAN ASSISTANT

## 2024-12-10 PROCEDURE — 2500000005 HC RX 250 GENERAL PHARMACY W/O HCPCS

## 2024-12-10 PROCEDURE — 99221 1ST HOSP IP/OBS SF/LOW 40: CPT | Performed by: SURGERY

## 2024-12-10 PROCEDURE — 2500000001 HC RX 250 WO HCPCS SELF ADMINISTERED DRUGS (ALT 637 FOR MEDICARE OP)

## 2024-12-10 PROCEDURE — 83735 ASSAY OF MAGNESIUM: CPT

## 2024-12-10 PROCEDURE — 99291 CRITICAL CARE FIRST HOUR: CPT

## 2024-12-10 PROCEDURE — 84484 ASSAY OF TROPONIN QUANT: CPT

## 2024-12-10 PROCEDURE — 36620 INSERTION CATHETER ARTERY: CPT | Performed by: STUDENT IN AN ORGANIZED HEALTH CARE EDUCATION/TRAINING PROGRAM

## 2024-12-10 PROCEDURE — 2020000001 HC ICU ROOM DAILY

## 2024-12-10 PROCEDURE — 85027 COMPLETE CBC AUTOMATED: CPT

## 2024-12-10 PROCEDURE — 83605 ASSAY OF LACTIC ACID: CPT

## 2024-12-10 PROCEDURE — 97162 PT EVAL MOD COMPLEX 30 MIN: CPT | Mod: GP

## 2024-12-10 PROCEDURE — 37799 UNLISTED PX VASCULAR SURGERY: CPT

## 2024-12-10 PROCEDURE — C8929 TTE W OR WO FOL WCON,DOPPLER: HCPCS

## 2024-12-10 PROCEDURE — 85610 PROTHROMBIN TIME: CPT

## 2024-12-10 PROCEDURE — 86901 BLOOD TYPING SEROLOGIC RH(D): CPT

## 2024-12-10 PROCEDURE — 36415 COLL VENOUS BLD VENIPUNCTURE: CPT

## 2024-12-10 PROCEDURE — 93005 ELECTROCARDIOGRAM TRACING: CPT

## 2024-12-10 PROCEDURE — 80053 COMPREHEN METABOLIC PANEL: CPT

## 2024-12-10 RX ORDER — ATORVASTATIN CALCIUM 20 MG/1
20 TABLET, FILM COATED ORAL DAILY
Status: DISCONTINUED | OUTPATIENT
Start: 2024-12-10 | End: 2024-12-12 | Stop reason: HOSPADM

## 2024-12-10 RX ORDER — OXYCODONE HYDROCHLORIDE 5 MG/1
5 TABLET ORAL EVERY 6 HOURS PRN
Status: DISCONTINUED | OUTPATIENT
Start: 2024-12-10 | End: 2024-12-12 | Stop reason: HOSPADM

## 2024-12-10 RX ORDER — SENNOSIDES 8.6 MG/1
1 TABLET ORAL NIGHTLY
Status: DISCONTINUED | OUTPATIENT
Start: 2024-12-10 | End: 2024-12-12 | Stop reason: HOSPADM

## 2024-12-10 RX ORDER — CARVEDILOL 3.12 MG/1
3.12 TABLET ORAL 2 TIMES DAILY
Status: DISCONTINUED | OUTPATIENT
Start: 2024-12-10 | End: 2024-12-11

## 2024-12-10 RX ORDER — PANTOPRAZOLE SODIUM 40 MG/1
40 TABLET, DELAYED RELEASE ORAL
Status: DISCONTINUED | OUTPATIENT
Start: 2024-12-11 | End: 2024-12-12 | Stop reason: HOSPADM

## 2024-12-10 RX ORDER — ASPIRIN 81 MG/1
81 TABLET ORAL DAILY
Status: DISCONTINUED | OUTPATIENT
Start: 2024-12-10 | End: 2024-12-12 | Stop reason: HOSPADM

## 2024-12-10 RX ORDER — ACETAMINOPHEN 325 MG/1
650 TABLET ORAL EVERY 6 HOURS PRN
Status: DISCONTINUED | OUTPATIENT
Start: 2024-12-10 | End: 2024-12-11

## 2024-12-10 RX ORDER — POLYETHYLENE GLYCOL 3350 17 G/17G
17 POWDER, FOR SOLUTION ORAL DAILY
Status: DISCONTINUED | OUTPATIENT
Start: 2024-12-10 | End: 2024-12-12 | Stop reason: HOSPADM

## 2024-12-10 RX ORDER — LIDOCAINE 560 MG/1
2 PATCH PERCUTANEOUS; TOPICAL; TRANSDERMAL DAILY
Status: DISCONTINUED | OUTPATIENT
Start: 2024-12-10 | End: 2024-12-12 | Stop reason: HOSPADM

## 2024-12-10 RX ORDER — ACETAMINOPHEN 325 MG/1
650 TABLET ORAL EVERY 6 HOURS PRN
Status: DISCONTINUED | OUTPATIENT
Start: 2024-12-10 | End: 2024-12-10

## 2024-12-10 SDOH — ECONOMIC STABILITY: INCOME INSECURITY: IN THE PAST 12 MONTHS HAS THE ELECTRIC, GAS, OIL, OR WATER COMPANY THREATENED TO SHUT OFF SERVICES IN YOUR HOME?: NO

## 2024-12-10 SDOH — ECONOMIC STABILITY: HOUSING INSECURITY: AT ANY TIME IN THE PAST 12 MONTHS, WERE YOU HOMELESS OR LIVING IN A SHELTER (INCLUDING NOW)?: NO

## 2024-12-10 SDOH — SOCIAL STABILITY: SOCIAL INSECURITY
WITHIN THE LAST YEAR, HAVE YOU BEEN RAPED OR FORCED TO HAVE ANY KIND OF SEXUAL ACTIVITY BY YOUR PARTNER OR EX-PARTNER?: NO

## 2024-12-10 SDOH — SOCIAL STABILITY: SOCIAL INSECURITY: WITHIN THE LAST YEAR, HAVE YOU BEEN AFRAID OF YOUR PARTNER OR EX-PARTNER?: NO

## 2024-12-10 SDOH — SOCIAL STABILITY: SOCIAL INSECURITY: DO YOU FEEL ANYONE HAS EXPLOITED OR TAKEN ADVANTAGE OF YOU FINANCIALLY OR OF YOUR PERSONAL PROPERTY?: NO

## 2024-12-10 SDOH — ECONOMIC STABILITY: FOOD INSECURITY: WITHIN THE PAST 12 MONTHS, YOU WORRIED THAT YOUR FOOD WOULD RUN OUT BEFORE YOU GOT THE MONEY TO BUY MORE.: NEVER TRUE

## 2024-12-10 SDOH — ECONOMIC STABILITY: HOUSING INSECURITY: IN THE LAST 12 MONTHS, WAS THERE A TIME WHEN YOU WERE NOT ABLE TO PAY THE MORTGAGE OR RENT ON TIME?: NO

## 2024-12-10 SDOH — ECONOMIC STABILITY: TRANSPORTATION INSECURITY: IN THE PAST 12 MONTHS, HAS LACK OF TRANSPORTATION KEPT YOU FROM MEDICAL APPOINTMENTS OR FROM GETTING MEDICATIONS?: NO

## 2024-12-10 SDOH — ECONOMIC STABILITY: HOUSING INSECURITY: IN THE PAST 12 MONTHS, HOW MANY TIMES HAVE YOU MOVED WHERE YOU WERE LIVING?: 0

## 2024-12-10 SDOH — SOCIAL STABILITY: SOCIAL INSECURITY: HAS ANYONE EVER THREATENED TO HURT YOUR FAMILY OR YOUR PETS?: NO

## 2024-12-10 SDOH — SOCIAL STABILITY: SOCIAL INSECURITY: WITHIN THE LAST YEAR, HAVE YOU BEEN HUMILIATED OR EMOTIONALLY ABUSED IN OTHER WAYS BY YOUR PARTNER OR EX-PARTNER?: NO

## 2024-12-10 SDOH — SOCIAL STABILITY: SOCIAL INSECURITY: DO YOU FEEL UNSAFE GOING BACK TO THE PLACE WHERE YOU ARE LIVING?: NO

## 2024-12-10 SDOH — SOCIAL STABILITY: SOCIAL INSECURITY: ABUSE: ADULT

## 2024-12-10 SDOH — SOCIAL STABILITY: SOCIAL INSECURITY
WITHIN THE LAST YEAR, HAVE YOU BEEN KICKED, HIT, SLAPPED, OR OTHERWISE PHYSICALLY HURT BY YOUR PARTNER OR EX-PARTNER?: NO

## 2024-12-10 SDOH — SOCIAL STABILITY: SOCIAL INSECURITY: ARE THERE ANY APPARENT SIGNS OF INJURIES/BEHAVIORS THAT COULD BE RELATED TO ABUSE/NEGLECT?: NO

## 2024-12-10 SDOH — ECONOMIC STABILITY: FOOD INSECURITY: WITHIN THE PAST 12 MONTHS, THE FOOD YOU BOUGHT JUST DIDN'T LAST AND YOU DIDN'T HAVE MONEY TO GET MORE.: NEVER TRUE

## 2024-12-10 SDOH — SOCIAL STABILITY: SOCIAL INSECURITY: HAVE YOU HAD THOUGHTS OF HARMING ANYONE ELSE?: NO

## 2024-12-10 SDOH — SOCIAL STABILITY: SOCIAL INSECURITY: DOES ANYONE TRY TO KEEP YOU FROM HAVING/CONTACTING OTHER FRIENDS OR DOING THINGS OUTSIDE YOUR HOME?: NO

## 2024-12-10 SDOH — SOCIAL STABILITY: SOCIAL INSECURITY: ARE YOU OR HAVE YOU BEEN THREATENED OR ABUSED PHYSICALLY, EMOTIONALLY, OR SEXUALLY BY ANYONE?: NO

## 2024-12-10 SDOH — ECONOMIC STABILITY: FOOD INSECURITY: HOW HARD IS IT FOR YOU TO PAY FOR THE VERY BASICS LIKE FOOD, HOUSING, MEDICAL CARE, AND HEATING?: NOT HARD AT ALL

## 2024-12-10 SDOH — SOCIAL STABILITY: SOCIAL INSECURITY: WERE YOU ABLE TO COMPLETE ALL THE BEHAVIORAL HEALTH SCREENINGS?: YES

## 2024-12-10 SDOH — ECONOMIC STABILITY: FOOD INSECURITY: HOW HARD IS IT FOR YOU TO PAY FOR THE VERY BASICS LIKE FOOD, HOUSING, MEDICAL CARE, AND HEATING?: NOT VERY HARD

## 2024-12-10 ASSESSMENT — COGNITIVE AND FUNCTIONAL STATUS - GENERAL
STANDING UP FROM CHAIR USING ARMS: A LITTLE
MOBILITY SCORE: 18
HELP NEEDED FOR BATHING: A LITTLE
TOILETING: A LITTLE
CLIMB 3 TO 5 STEPS WITH RAILING: A LITTLE
EATING MEALS: A LITTLE
MOVING FROM LYING ON BACK TO SITTING ON SIDE OF FLAT BED WITH BEDRAILS: A LITTLE
TURNING FROM BACK TO SIDE WHILE IN FLAT BAD: A LITTLE
PERSONAL GROOMING: A LITTLE
CLIMB 3 TO 5 STEPS WITH RAILING: A LITTLE
WALKING IN HOSPITAL ROOM: A LITTLE
MOVING TO AND FROM BED TO CHAIR: A LITTLE
DRESSING REGULAR UPPER BODY CLOTHING: A LITTLE
TURNING FROM BACK TO SIDE WHILE IN FLAT BAD: A LITTLE
MOVING TO AND FROM BED TO CHAIR: A LITTLE
DAILY ACTIVITIY SCORE: 18
WALKING IN HOSPITAL ROOM: A LITTLE
STANDING UP FROM CHAIR USING ARMS: A LITTLE
PATIENT BASELINE BEDBOUND: NO
MOBILITY SCORE: 18
MOVING FROM LYING ON BACK TO SITTING ON SIDE OF FLAT BED WITH BEDRAILS: A LITTLE
DRESSING REGULAR LOWER BODY CLOTHING: A LITTLE

## 2024-12-10 ASSESSMENT — PAIN SCALES - GENERAL
PAINLEVEL_OUTOF10: 0 - NO PAIN
PAINLEVEL_OUTOF10: 5 - MODERATE PAIN
PAINLEVEL_OUTOF10: 6
PAINLEVEL_OUTOF10: 5 - MODERATE PAIN
PAINLEVEL_OUTOF10: 0 - NO PAIN
PAINLEVEL_OUTOF10: 5 - MODERATE PAIN
PAINLEVEL_OUTOF10: 0 - NO PAIN
PAINLEVEL_OUTOF10: 0 - NO PAIN

## 2024-12-10 ASSESSMENT — PAIN - FUNCTIONAL ASSESSMENT
PAIN_FUNCTIONAL_ASSESSMENT: 0-10

## 2024-12-10 ASSESSMENT — ENCOUNTER SYMPTOMS
RESPIRATORY NEGATIVE: 1
GASTROINTESTINAL NEGATIVE: 1
CARDIOVASCULAR NEGATIVE: 1
EYES NEGATIVE: 1
MUSCULOSKELETAL NEGATIVE: 1
ENDOCRINE NEGATIVE: 1
CONSTITUTIONAL NEGATIVE: 1

## 2024-12-10 ASSESSMENT — LIFESTYLE VARIABLES
HOW OFTEN DO YOU HAVE 6 OR MORE DRINKS ON ONE OCCASION: NEVER
HOW MANY STANDARD DRINKS CONTAINING ALCOHOL DO YOU HAVE ON A TYPICAL DAY: 1 OR 2
SKIP TO QUESTIONS 9-10: 1
HOW OFTEN DO YOU HAVE A DRINK CONTAINING ALCOHOL: 2-4 TIMES A MONTH
AUDIT-C TOTAL SCORE: 2
AUDIT-C TOTAL SCORE: 2

## 2024-12-10 ASSESSMENT — ACTIVITIES OF DAILY LIVING (ADL)
HEARING - LEFT EAR: FUNCTIONAL
DRESSING YOURSELF: INDEPENDENT
HEARING - RIGHT EAR: FUNCTIONAL
LACK_OF_TRANSPORTATION: NO
LACK_OF_TRANSPORTATION: NO
JUDGMENT_ADEQUATE_SAFELY_COMPLETE_DAILY_ACTIVITIES: YES
FEEDING YOURSELF: INDEPENDENT
GROOMING: INDEPENDENT
ADEQUATE_TO_COMPLETE_ADL: YES
PATIENT'S MEMORY ADEQUATE TO SAFELY COMPLETE DAILY ACTIVITIES?: YES
BATHING: INDEPENDENT
WALKS IN HOME: INDEPENDENT
TOILETING: INDEPENDENT
ADL_ASSISTANCE: INDEPENDENT
LACK_OF_TRANSPORTATION: NO

## 2024-12-10 ASSESSMENT — PATIENT HEALTH QUESTIONNAIRE - PHQ9
1. LITTLE INTEREST OR PLEASURE IN DOING THINGS: NOT AT ALL
SUM OF ALL RESPONSES TO PHQ9 QUESTIONS 1 & 2: 0
2. FEELING DOWN, DEPRESSED OR HOPELESS: NOT AT ALL

## 2024-12-10 ASSESSMENT — PAIN DESCRIPTION - ORIENTATION
ORIENTATION: LOWER;MID;UPPER
ORIENTATION: LOWER;MID;UPPER
ORIENTATION: LOWER;MID;RIGHT;LEFT;UPPER

## 2024-12-10 ASSESSMENT — COLUMBIA-SUICIDE SEVERITY RATING SCALE - C-SSRS
1. IN THE PAST MONTH, HAVE YOU WISHED YOU WERE DEAD OR WISHED YOU COULD GO TO SLEEP AND NOT WAKE UP?: NO
2. HAVE YOU ACTUALLY HAD ANY THOUGHTS OF KILLING YOURSELF?: NO
6. HAVE YOU EVER DONE ANYTHING, STARTED TO DO ANYTHING, OR PREPARED TO DO ANYTHING TO END YOUR LIFE?: NO

## 2024-12-10 ASSESSMENT — PAIN DESCRIPTION - LOCATION
LOCATION: BACK

## 2024-12-10 NOTE — PROGRESS NOTES
12/10/24 1446   Discharge Planning   Living Arrangements Alone   Support Systems Children;Friends/neighbors   Assistance Needed n/a   Type of Residence Private residence   Do you have animals or pets at home? No   Who is requesting discharge planning? Provider   Home or Post Acute Services   (TBD)   Does the patient need discharge transport arranged? No   Financial Resource Strain   How hard is it for you to pay for the very basics like food, housing, medical care, and heating? Not very   Housing Stability   In the last 12 months, was there a time when you were not able to pay the mortgage or rent on time? N   In the past 12 months, how many times have you moved where you were living? 0   At any time in the past 12 months, were you homeless or living in a shelter (including now)? N   Transportation Needs   In the past 12 months, has lack of transportation kept you from medical appointments or from getting medications? no   In the past 12 months, has lack of transportation kept you from meetings, work, or from getting things needed for daily living? No     - ICU TREATMENT PLAN: Patient initially presented to [facility] ED for back pain. 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 Transferred to [facility] CICU for further management.  - Payer: Medicare, [employer].  -Support System: Daughter, friend   - Planned Disposition: Pending medical outcome and rehab recommendations.    - Additional Information: SDOH and Social Work Discharge Planning assessments were completed with the patient. There were no SDOH issues identified.  - Barriers to discharge: None at this time.

## 2024-12-10 NOTE — H&P
CARDIAC ICU HISTORY AND PHYSICAL   Chief Complaint: back pain     HPI  89 yo F with a PMHx of HLD, CAD (hx of multivessel disease in 2012?), hx of ascending thoracic aortic aneurysm (4.5cm), descending thoracic aortic aneurysm (5.1 cm), hx of R carotid to R subclavian bypass and arch repair for ascending aneurysm and Kommerl's diverticulum repair in 2012 presenting to the ED for back pain. Found to have new interval enlargement of ascending and descending thoracic aorta aneurysmal sac with endoleak and a 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 Admitted to the CICU for impulse control.     Pt developed back pain earlier today at 2AM and woke up from sleep. In the ED at [facility], CTA C/A/P was notable for dissection flap R carotid to R subclavian artery bypass graft, interval enlargement of ascending and descending thoracic aortic aneurysmal sac with concern for endoleak. The descending thoracic aneurysm found to be compression the left artium and left ventricle. There was also interval enlargement of fusiform abdominal aortic aneurysm measuring 3.2 cm. In the ED, she was given Flexeril 5 mg once, Toradol 15 mg once, and morphine 4 mg once. Also started on labetalol for impulse control and transferred to [facility] for further management.     In the ED:   - Vitals:  T 97.8, HR 73, /73,  RR 16, SpO2 94 on RA  - Labs:  CBC: WBC  9.7, Hgb 13.6, plt 131  BMP: Na 139, K 4.0, Cl 103, HCO3 27, BUN 12, Cr 0.66, glu 111  LFT: Ca 8.3, tprot 6.5, alb 3.8, alkphos 108, AST 28, ALT 17, tbili 0.8  hs-TnI 37 ->33    - Imaging:  CTA C/A/P   Impression:  VASCULAR:   1. 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 Additional postsurgical changes of right carotid   artery to right subclavian artery bypass synthetic graft. There is   suggestion  with a dissection flap within this graft.   2. Significant interval enlargement of aneurysmal sac in the   ascending and descending thoracic aorta. There is contrast pooling   into the excluded aneurysmal sac, concerning for an endoleak within   limitations of a two phase study. In addition, the descending   thoracic aneurysmal sac is seen compressing the left atrium and left   ventricle. Vascular surgery consultation is recommended.   3. Interval enlargement in fusiform abdominal aortic aneurysm now   measuring up to 3.2 cm. Multifocal ulcerating atherosclerotic plaque   throughout the abdominal aorta.       CHEST ABDOMEN PELVIS:   1. New atrophic appearance of the right kidney in the setting of   severe segmental narrowing of the dominant right renal artery at its   origin is most compatible with ischemic injury.   2. New spiculated pulmonary nodule in the superior segment of left   lower lobe measuring 1.9 cm, concerning for malignancy. Consider   tissue sampling for further evaluation.   3. Additional incidental non-acute findings as detailed above.     CXR:  IMPRESSION:  1. No acute pulmonary pathology.  2. There is a 1.2 cm density in the left midlung. There is also a 1.3  cm density in the left lung base, though this could represent the left  nipple. These could represent masses or nodules. Recommend chest CT  for further evaluation.    CT Lumbar   IMPRESSION:  No acute lumbar fracture.  Fusiform aneurysm distal descending thoracic aorta is 6.4 x 5.3 cm.   Recommend cardiovascular surgery follow-up.  Disc osteophyte complex L5/S1 with mild central canal stenosis.  Mild right-sided L3/4 neural foramen stenosis and mild left-sided  L5/S1 neural foramen stenosis due to osteophytes.    CT Thoracic   IMPRESSION:  1. There is mild compression of the superior endplate of T5. The age  of this is unclear.  2. There is a spiculated nodule at the left lower lobe. This is  suspicious for malignancy. Recommend chest CT  for further evaluation.  3. There is aneurysmal dilatation of the entire thoracic aorta.      Cardiac Hx:  CTA H/N 2/2024  IMPRESSION:  No evidence for significant stenosis of the cervical vessels.      No evidence for significant stenosis or large branch vessel cutoffs  of the intracranial vessels.      No acute intracranial hemorrhage, mass effect or midline shift.      Nonspecific scattered white matter hypodensities favored to represent  sequela of small vessel ischemia.  If there is persistent clinical  concern for acute ischemia, consider further evaluation with MRI.      Redemonstrated aneurysmal dilatation of the visualized thoracic aorta  with the visualized portions of the ascending aorta measuring up 4.5  cm and the proximal aspect of the visualized descending aorta  measuring up to 5.1 cm. These findings have increased compared to  prior CT chest 06/20/2014.    CT soft tissue neck w con 7/2021  IMPRESSION:  No discrete abnormal neck mass is noted.     The limited CT images through the upper thorax demonstrate  postoperative changes compatible with a previous sternotomy. There is  again evidence of aneurysmal dilatation of the aortic arch with  calcified and noncalcified atherosclerotic plaque along the margins  of the aortic arch. There is again evidence of an area of partially  thrombus filled irregular focal aneurysmal dilatation along superior  margin of the aortic arch arising distal to the origin of the left  subclavian artery. On the current study, there is interval increased  vascular enhancement penetrating the thrombus within this focal area  of aneurysmal dilatation when compared with 06/27/2017 currently  extending to the superior margin of the focal aneurysmal dilatation  as seen on coronal reconstructed slice 69 of 121 and sagittal  reconstructed slice 77 of 130. Further evaluation with a dedicated CT  angiogram of the chest would be recommended.    CT A/P wo contrast 6/2018  IMPRESSION:  1.  Stable dimensions of aneurysm extending from the aortic arch to  the suprarenal abdominal aorta.  2. Stable subcentimeter ground-glass nodules of the lungs. Suggest  continued attention on follow-up    CTA C/A/P w contrast 6/2017   IMPRESSION:  1. Since the prior CTA abdomen pelvis of 09/11/2014 and CTA chest of  06/20/2014, there has been progressive aneurysmal dilatation of the  descending thoracic aorta which now measures 4.8 cm in caliber  downstream of the postoperative aortic arch, previously 4.3 cm. It  has decreased along its descent into the abdomen and now measures 6.0  cm at the diaphragmatic hiatus, previously 5.6 cm.  2. Stable postoperative surgical findings of ascending thoracic  Aortic aneurysm and Kommerell's diverticulum repair, with patency of  right carotid to subclavian artery bypass graft.  3. 7 mm posterior left lower lobe ground-glass pulmonary nodule. A 3  month follow up noncontrast chest CT is recommended to evaluate for  longitudinal persistence.  4. Additional biapical patchy areas of ground-glass attenuation are  present which are indeterminate but may be related to small airways  inflammation/infection.  5. Status post cholecystectomy, hysterectomy.  6. Colonic diverticulosis.     PMH:   ***   SurgHx:   Past Surgical History:   Procedure Laterality Date    APPENDECTOMY  03/12/2014    Appendectomy    CT ABDOMEN PELVIS ANGIOGRAM W AND/OR WO IV CONTRAST  6/6/2013    CT ABDOMEN PELVIS ANGIOGRAM W AND/OR WO IV CONTRAST 6/6/2013 [facility] ANCILLARY LEGACY    CT ABDOMEN PELVIS ANGIOGRAM W AND/OR WO IV CONTRAST  9/11/2014    CT ABDOMEN PELVIS ANGIOGRAM W AND/OR WO IV CONTRAST 9/11/2014 [facility]    CT ABDOMEN PELVIS ANGIOGRAM W AND/OR WO IV CONTRAST  6/27/2017    CT ABDOMEN PELVIS ANGIOGRAM W AND/OR WO IV CONTRAST 6/27/2017 [facility]CILLARY LEGAC[facility]    CT ABDOMEN PELVIS ANGIOGRAM W AND/OR WO IV CONTRAST  1/27/2012    CT ABDOMEN PELVIS ANGIOGRAM W AND/OR WO IV CONTRAST 1/27/2012 [facility] ANCILLARY  LEGACY    HYSTERECTOMY  2014    Hysterectomy    MR HEAD ANGIO WO IV CONTRAST  3/21/2012    MR HEAD ANGIO WO IV CONTRAST 3/21/2012 [facility] CLINICAL LEGACY    MR NECK ANGIO WO IV CONTRAST  3/21/2012    MR NECK ANGIO WO IV CONTRAST 3/21/2012 [facility] CLINICAL LEGACY    OTHER SURGICAL HISTORY  2014    Aortic Arch Dissection Repair    OTHER SURGICAL HISTORY  2014    Laparoscopic Cholecystectomy With Cholangiography    TONSILLECTOMY  2014    Tonsillectomy With Adenoidectomy    TOTAL SHOULDER ARTHROPLASTY  2014    Shoulder Arthroplasty Total Shoulder Replacement      Allergies:   Allergies   Allergen Reactions    Penicillins Other, Unknown, Swelling and Rash      SocHx:    FamHx:  Family History   Problem Relation Name Age of Onset    Other (cardiac disorder) Father      Asthma Father          Home Medications:  No current facility-administered medications on file prior to encounter.     Current Outpatient Medications on File Prior to Encounter   Medication Sig Dispense Refill    aspirin 81 mg EC tablet Take 1 tablet (81 mg) by mouth once daily.      atorvastatin (Lipitor) 20 mg tablet TAKE ONE TABLET BY MOUTH EVERY DAY 90 tablet 0    denosumab (Prolia) 60 mg/mL syringe Inject 1 mL (60 mg total) under the skin every 6 months. 1 mL 1    multivit-min/ferrous fumarate (MULTI VITAMIN ORAL) Take by mouth early in the morning..          Objective:    24 Hour Vitals  Temperature:  [36.6 °C (97.8 °F)] 36.6 °C (97.8 °F)  Heart Rate:  [71-85] 73  Respirations:  [15-16] 15  BP: (113-151)/(76-88) 151/88    Temp (24hrs), Av.6 °C (97.8 °F), Min:36.6 °C (97.8 °F), Max:36.6 °C (97.8 °F)     24 hour Intake/Output  No intake or output data in the 24 hours ending 24     Exam:  General: Resting comfortably in bed. Well developed, well nourished. Appears stated age. In no acute distress   HEENT: Normocephalic and atraumatic. EOMI, sclera non-icteric, MMM, no JVD  Cardiovascular: RRR, no r/m/g  Pulmonary:  Lungs clear to auscultation bilaterally. No wheezes/ rhonchi/ rales   GI: +BS, soft, non-tender, non-distended  : No suprapubic/ flank pain  Extremities: No LE edema   Skin: warm and dry. No rashes or lesions   Neurologic: CN II-XII grossly intact. No focal neurologic deficit   Psych: Pleasant. Appropriate mood and affect     Labs  Results for orders placed or performed during the hospital encounter of 12/09/24 (from the past 24 hours)   ECG 12 lead   Result Value Ref Range    Ventricular Rate 72 BPM    Atrial Rate 72 BPM    NH Interval 134 ms    QRS Duration 90 ms    QT Interval 426 ms    QTC Calculation(Bazett) 466 ms    P Axis 82 degrees    R Axis 77 degrees    T Axis 91 degrees    QRS Count 12 beats    Q Onset 226 ms    P Onset 159 ms    P Offset 212 ms    T Offset 439 ms    QTC Fredericia 452 ms   CBC and Auto Differential   Result Value Ref Range    WBC 9.7 4.4 - 11.3 x10*3/uL    nRBC 0.0 0.0 - 0.0 /100 WBCs    RBC 4.40 4.00 - 5.20 x10*6/uL    Hemoglobin 13.6 12.0 - 16.0 g/dL    Hematocrit 41.2 36.0 - 46.0 %    MCV 94 80 - 100 fL    MCH 30.9 26.0 - 34.0 pg    MCHC 33.0 32.0 - 36.0 g/dL    RDW 12.2 11.5 - 14.5 %    Platelets 131 (L) 150 - 450 x10*3/uL    Neutrophils % 89.6 40.0 - 80.0 %    Immature Granulocytes %, Automated 0.4 0.0 - 0.9 %    Lymphocytes % 5.8 13.0 - 44.0 %    Monocytes % 3.9 2.0 - 10.0 %    Eosinophils % 0.1 0.0 - 6.0 %    Basophils % 0.2 0.0 - 2.0 %    Neutrophils Absolute 8.66 (H) 1.60 - 5.50 x10*3/uL    Immature Granulocytes Absolute, Automated 0.04 0.00 - 0.50 x10*3/uL    Lymphocytes Absolute 0.56 (L) 0.80 - 3.00 x10*3/uL    Monocytes Absolute 0.38 0.05 - 0.80 x10*3/uL    Eosinophils Absolute 0.01 0.00 - 0.40 x10*3/uL    Basophils Absolute 0.02 0.00 - 0.10 x10*3/uL   Comprehensive metabolic panel   Result Value Ref Range    Glucose 111 (H) 74 - 99 mg/dL    Sodium 139 136 - 145 mmol/L    Potassium 4.0 3.5 - 5.3 mmol/L    Chloride 103 98 - 107 mmol/L    Bicarbonate 27 21 - 32 mmol/L     Anion Gap 13 10 - 20 mmol/L    Urea Nitrogen 12 6 - 23 mg/dL    Creatinine 0.66 0.50 - 1.05 mg/dL    eGFR 85 >60 mL/min/1.73m*2    Calcium 8.3 (L) 8.6 - 10.3 mg/dL    Albumin 3.8 3.4 - 5.0 g/dL    Alkaline Phosphatase 108 33 - 136 U/L    Total Protein 6.5 6.4 - 8.2 g/dL    AST 28 9 - 39 U/L    Bilirubin, Total 0.8 0.0 - 1.2 mg/dL    ALT 17 7 - 45 U/L   Troponin I, High Sensitivity, Initial   Result Value Ref Range    Troponin I, High Sensitivity 37 (H) 0 - 13 ng/L   Urinalysis with Reflex Culture and Microscopic   Result Value Ref Range    Color, Urine Light-Yellow Light-Yellow, Yellow, Dark-Yellow    Appearance, Urine Clear Clear    Specific Gravity, Urine 1.020 1.005 - 1.035    pH, Urine 6.5 5.0, 5.5, 6.0, 6.5, 7.0, 7.5, 8.0    Protein, Urine 10 (TRACE) NEGATIVE, 10 (TRACE), 20 (TRACE) mg/dL    Glucose, Urine Normal Normal mg/dL    Blood, Urine NEGATIVE NEGATIVE    Ketones, Urine 60 (2+) (A) NEGATIVE mg/dL    Bilirubin, Urine NEGATIVE NEGATIVE    Urobilinogen, Urine Normal Normal mg/dL    Nitrite, Urine NEGATIVE NEGATIVE    Leukocyte Esterase, Urine NEGATIVE NEGATIVE   Urinalysis Microscopic   Result Value Ref Range    WBC, Urine 1-5 1-5, NONE /HPF    RBC, Urine NONE NONE, 1-2, 3-5 /HPF    Squamous Epithelial Cells, Urine 1-9 (SPARSE) Reference range not established. /HPF    Mucus, Urine FEW Reference range not established. /LPF   Troponin, High Sensitivity, 1 Hour   Result Value Ref Range    Troponin I, High Sensitivity 33 (H) 0 - 13 ng/L           Imaging  CT angio chest abdomen pelvis   Final Result   VASCULAR:   1. 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 Additional postsurgical changes of right carotid   artery to right subclavian artery bypass synthetic graft. There is   suggestion with a dissection flap within this graft.   2.  Significant interval enlargement of aneurysmal sac in the   ascending and descending thoracic aorta. There is contrast pooling   into the excluded aneurysmal sac, concerning for an endoleak within   limitations of a two phase study. In addition, the descending   thoracic aneurysmal sac is seen compressing the left atrium and left   ventricle. Vascular surgery consultation is recommended.   3. Interval enlargement in fusiform abdominal aortic aneurysm now   measuring up to 3.2 cm. Multifocal ulcerating atherosclerotic plaque   throughout the abdominal aorta.        CHEST ABDOMEN PELVIS:   1. New atrophic appearance of the right kidney in the setting of   severe segmental narrowing of the dominant right renal artery at its   origin is most compatible with ischemic injury.   2. New spiculated pulmonary nodule in the superior segment of left   lower lobe measuring 1.9 cm, concerning for malignancy. Consider   tissue sampling for further evaluation.   3. Additional incidental non-acute findings as detailed above.             I personally reviewed the images/study and I agree with the findings   as stated by Dr. Patel Curry. This study was interpreted at [facility], [place].        MACRO:   Patel Curry discussed the significance and urgency of this critical   finding by epic secure chat with  ALYCIA PAEZ on 12/9/2024 at   5:51 pm.  (**-RCF-**) Findings:  See findings.        .        Signed by: Felipe Rajput 12/9/2024 6:17 PM   Dictation workstation:   [id number]      CT lumbar spine wo IV contrast   Final Result   No acute lumbar fracture.   Fusiform aneurysm distal descending thoracic aorta is 6.4 x 5.3 cm.    Recommend cardiovascular surgery follow-up.   Disc osteophyte complex L5/S1 with mild central canal stenosis.   Mild right-sided L3/4 neural foramen stenosis and mild left-sided   L5/S1 neural foramen stenosis due to osteophytes.   Signed by Klever Wright MD      CT  thoracic spine wo IV contrast   Final Result   1. There is mild compression of the superior endplate of T5. The age   of this is unclear.   2. There is a spiculated nodule at the left lower lobe. This is   suspicious for malignancy. Recommend chest CT for further evaluation.   3. There is aneurysmal dilatation of the entire thoracic aorta.   Recommend CTA for further evaluation.   Signed by David Nicole MD      XR chest 1 view   Final Result   1. No acute pulmonary pathology.   2. There is a 1.2 cm density in the left midlung. There is also a 1.3   cm density in the left lung base, though this could represent the left   nipple. These could represent masses or nodules. Recommend chest CT   for further evaluation.   Signed by David Nicole MD           Medications   Scheduled Medications    Continuous Medications    PRN Medications  PRN medications: labetaloL       Assessment/Plan:  89 yo F with a PMHx of HLD, CAD (hx of multivessel disease in 2012?), hx of ascending thoracic aortic aneurysm (4.5cm), descending thoracic aortic aneurysm (5.1 cm), hx of R carotid to R subclavian bypass and arch repair for ascending aneurysm and Kommerl's diverticulum repair in 2012 presenting to the ED for back pain. Found to have new interval enlargement of ascending and descending thoracic aorta aneurysmal sac with endoleak and a 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 Admitted to the CICU for impulse control.     NEUROLOGIC  -FABIOLA     CARDIOVASCULAR  #Thoracic Aortic Aneurysm   #Thoracic Endoleak   ::hx of ascending thoracic aortic aneurysm (4.5cm), descending thoracic aortic aneurysm (5.1 cm)  ::hx of R carotid to R subclavian bypass and arch repair for ascending aneurysm and Kommerl's diverticulum repair in 2012  ::pt previously decline further work up and evaluation of her known thoracic aortic aneurysms   ::CTA chest/abdomen/pelvis notable for dissection flap of R carotid to R subclavian artery  bypass graft, interval enlargement of ascending and descending thoracic aortic aneurysmal sac with concern for endoleak. The descending thoracic aneurysm found to be compression the left artium and left ventricle. ::Interval enlargement of fusiform abdominal aortic aneurysm measuring 3.2 cm.   ::s/p labetalol in the ED   -Consult vascular surgery   -Impulse control with labetalol and nicardipine, goal SBP <120, HR < 60   -Formal TTE in the AM   -Monitor on tele   -Keep NPO     #CAD   -C/w asa 81 mg, atorvastatin 20 mg     PULMONARY  #Pulmonary nodules  ::CTA chest notable for new spiculated pulmonary nodule in the superior segment of left  lower lobe measuring 1.9 cm, concerning for malignancy  ::CXR notable for 1.2 cm density in the left midlung and possible 1.3cm density in the left lung base   -Consider pulm vs IR consult in the AM for biopsy     RENAL/  #Atrophic R kidney   ::CTA notable for new atrophic appearance of the right kidney in the setting of severe segmental narrowing of the dominant right renal artery    GASTROINTESTINAL  -FABIOLA    ENDOCRINE  -FABIOLA     HEME/ONC  -FABIOLA     INFECTIOUS DISEASE  -FABIOLA    MSK/DERM  -FABIOLA     F: PRN  E: K>4, Mg>2  N: NPO  A: Piv, antonio    Oxygen:   Abx: none  Gtts: labetalol, nicardipine     DVT ppx: heparin subqu  GI ppx: none     Code Status: Full (confirmed on admission)  Surrogate Medical Decision-maker:  Carmina [place] [phone]

## 2024-12-10 NOTE — PROGRESS NOTES
Physical Therapy    Physical Therapy Evaluation    Patient Name: Catarina Cutler  MRN: [id number]  Department: [facility]  Room: 17/17-A  Today's Date: 12/10/2024   Time Calculation  Start Time: 1449  Stop Time: 1505  Time Calculation (min): 16 min    Assessment/Plan   PT Assessment  PT Assessment Results: Decreased strength, Impaired balance, Decreased endurance, Decreased mobility  Rehab Prognosis: Excellent  Barriers to Discharge: medical acuity  End of Session Communication: Bedside nurse  End of Session Patient Position: Bed, 3 rail up, Alarm off, not on at start of session  IP OR SWING BED PT PLAN  Inpatient or Swing Bed: Inpatient  PT Plan  Treatment/Interventions: Bed mobility, Transfer training, Gait training, Stair training, Balance training, Strengthening, Endurance training, Therapeutic activity, Therapeutic exercise  PT Plan: Ongoing PT  PT Frequency: 3 times per week  PT Discharge Recommendations: Low intensity level of continued care  PT Recommended Transfer Status: Assist x1  PT - OK to Discharge: Yes    Subjective     General Visit Information:  General  Reason for Referral: new interval enlargement of ascending and descending thoracic aorta aneurysmal sac with possible endoleak and a 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 Admitted to the CICU for impulse control.  Past Medical History Relevant to Rehab: HLD, CAD (hx of multivessel disease in 2012?), hx of ascending thoracic aortic aneurysm (4.5cm), descending thoracic aortic aneurysm (5.1 cm), TAAA s/p R carotid to R subclavian bypass and arch repair for ascending aneurysm and Kommerl's diverticulum repair in 2012  Family/Caregiver Present: Yes  Caregiver Feedback: Pt's daughter present and supportive  Prior to Session Communication: Bedside nurse  Patient Position Received: Bed, 3 rail up, Alarm off, not on at start of session  General Comment: Pt pleasant and motivated to get OOB. RN present and supportive throughout. Pt  seems to have some limited insight into overall safety.    Home Living:  Home Living  Type of Home: [place]  Lives With: Alone  Home Adaptive Equipment:  (walking stick; owns a FWW somewhere)  Home Layout: Able to live on main level with bedroom/bathroom, Two level  Home Access: Stairs to enter with rails  Entrance Stairs-Rails: Both  Entrance Stairs-Number of Steps: 1 threshold step    Prior Level of Function:  Prior Function Per Pt/Caregiver Report  Level of [place]: Independent with ADLs and functional transfers  ADL Assistance: Independent  Homemaking Assistance: Independent  Ambulatory Assistance: Independent (Community amblator, no AD; walking stick sometimes but not all the time. 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, minor league jobs)  Leisure: likes to read    Precautions:  Precautions  Medical Precautions: Cardiac precautions, Fall precautions, Oxygen therapy device and L/min  Precautions Comment: Impulse control: SBP <120; HR<60     Lines/Tubes:   Telemetry   Arterial line     Vital Signs     Vitals Session Pre PT During PT Post PT   Heart Rate 63 70s 61   Resp 17  15   SpO2 95  97   /48 SBP in 120s 119/58     Objective     Pain:  Pain Assessment  Pain Assessment: 0-10  0-10 (Numeric) Pain Score:  (Pt denies pain at rest, but does have L side back pain at 5-6/10 with mobility.)    Cognition:  Cognition  Overall Cognitive Status:  (Slightly impaired insight)  Arousal/Alertness: Appropriate responses to stimuli  Orientation Level: Oriented X4  Following Commands: Follows one step commands with repetition    General Assessments:   Activity Tolerance  Early Mobility/Exercise Safety Screen: Proceed with mobilization - No exclusion criteria met    Sensation  Light Touch:  (Intact light touch sensation in B LEs)    Strength  Strength Comments: B LE strength >/= 3+/5 throughout  Strength  Strength Comments: B LE strength >/= 3+/5 throughout    Static Sitting  Balance  Static Sitting-Balance Support: No upper extremity supported, Feet supported  Static Sitting-Level of Assistance: Contact guard  Dynamic Sitting Balance  Dynamic Sitting-Balance Support: No upper extremity supported, Feet supported  Dynamic Sitting-Level of Assistance: Contact guard    Static Standing Balance  Static Standing-Balance Support: No upper extremity supported  Static Standing-Level of Assistance: Contact guard  Dynamic Standing Balance  Dynamic Standing-Balance Support: No upper extremity supported  Dynamic Standing-Level of Assistance: Contact guard, Minimum assistance    Functional Assessments:  Bed Mobility  Bed Mobility: Yes  Bed Mobility 1  Bed Mobility 1: Supine to sitting  Level of Assistance 1: Contact guard  Bed Mobility Comments 1: HOB slightly elevated; increased effort to complete  Bed Mobility 2  Bed Mobility  2: Sitting to supine  Level of Assistance 2: Contact guard  Bed Mobility Comments 2: HOB slightly elevated    Transfers  Transfer: Yes  Transfer 1  Transfer From 1: Sit to  Transfer to 1: Stand  Technique 1: Sit to stand  Transfer Level of Assistance 1: Contact guard  Trials/Comments 1: x2 trials  Transfers 2  Transfer From 2: Stand to  Transfer to 2: Sit  Technique 2: Stand to sit  Transfer Level of Assistance 2: Contact guard  Trials/Comments 2: x 2 trials; decreased eccentric control    Ambulation/Gait Training  Ambulation/Gait Training Performed: Yes  Ambulation/Gait Training 1  Surface 1: Level tile  Device 1: No device  Assistance 1:  (CGA-minAx1 with HHA)  Quality of Gait 1: Narrow base of support, Forward flexed posture  Comments/Distance (ft) 1: 8 ft x 2 trials with seated break    Extremity/Trunk Assessments:  RLE   RLE : Within Functional Limits  LLE   LLE : Within Functional Limits    Outcome Measures:  [facility] Basic Mobility  Turning from your back to your side while in a flat bed without using bedrails: A little  Moving from lying on your back to sitting on the  side of a flat bed without using bedrails: A little  Moving to and from bed to chair (including a wheelchair): A little  Standing up from a chair using your arms (e.g. wheelchair or bedside chair): A little  To walk in hospital room: A little  Climbing 3-5 steps with railing: A little  Basic Mobility - Total Score: 18    Confusion Assessment Method-ICU (CAM-ICU)  Feature 1: Acute Onset or Fluctuating Course: Negative  Overall CAM-ICU: Negative    FSS-ICU  Ambulation: Walks <50 feet with any assistance x1 or walks any distance with assistance x2 people  Rolling: Supervision or set-up only  Sitting: Supervision or set-up only  Transfer Sit-to-Stand: Minimal assistance (performs 75% or more of task)  Transfer Supine-to-Sit: Minimal assistance (performs 75% or more of task)  Total Score: 19    Early Mobility/Exercise Safety Screen: Proceed with mobilization - No exclusion criteria met  ICU Mobility Scale: Walking with assistance of 1 person [8]  E = Exercise and Early Mobility  Early Mobility/Exercise Safety Screen: Proceed with mobilization - No exclusion criteria met  ICU Mobility Scale: Walking with assistance of 1 person    Encounter Problems       Encounter Problems (Active)       Balance       Pt will score >24 on [place] for low risk of falls (Progressing)       Start:  12/10/24    Expected End:  12/24/24               Mobility       Patient will ambulate >250 ft with LRAD and supervision (Progressing)       Start:  12/10/24    Expected End:  12/24/24            Patient will ambulate up and down a curb/step with B grab bars with supervision (Progressing)       Start:  12/10/24    Expected End:  12/24/24               PT Transfers       Patient to transfer to and from sit to supine indep (Progressing)       Start:  12/10/24    Expected End:  12/24/24            Patient will transfer sit to and from stand indep (Progressing)       Start:  12/10/24    Expected End:  12/24/24               Pain - Adult             Education Documentation  Mobility Training, taught by Isabel Roberts PT at 12/10/2024  3:55 PM.  Learner: Patient  Readiness: Acceptance  Method: Explanation  Response: Needs Reinforcement  Comment: mobility precautions    Education Comments  No comments found.    Signed by Isabel Roberts DPT

## 2024-12-10 NOTE — H&P
CARDIAC ICU HISTORY AND PHYSICAL     Chief Complaint: back pain, endoleak     HPI  89 yo F with a PMHx of HLD, CAD (hx of multivessel disease in 2012?), hx of ascending thoracic aortic aneurysm (4.5cm), descending thoracic aortic aneurysm (5.1 cm), TAAA s/p R carotid to R subclavian bypass and arch repair for ascending aneurysm and Kommerl's diverticulum repair in 2012 presenting to [facility] ED for back pain. 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 Admitted to the CICU for impulse control. Patient upset and unwilling to participate in history taking.     Per chart, developed back pain earlier today at 2AM and woke up from her sleep. Reports she felt the pain all over in her chest and back. In the ED at [facility], CTA C/A/P was notable for dissection flap R carotid to R subclavian artery bypass graft, interval enlargement of ascending and descending thoracic aortic aneurysmal sac with concern for endoleak. The descending thoracic aneurysm found to be compressing the left artium and left ventricle. There was also interval enlargement of fusiform abdominal aortic aneurysm measuring 3.2 cm. In the ED, she was given Flexeril 5 mg once, Toradol 15 mg once, and morphine 4 mg once. She was given IV labetalol 10 mg and 20 mg pushes for impulse control and transferred to [facility] for further management.     On arrival to the CICU, pt HDS. SBP 140s and HR 70s and currently not on a labetalol drip. Repots her chest pain is improved compared to earlier.     In the ED:   - Vitals:  T 97.8, HR 73, /73,  RR 16, SpO2 94 on RA  - Labs:  CBC: WBC  9.7, Hgb 13.6, plt 131  BMP: Na 139, K 4.0, Cl 103, HCO3 27, BUN 12, Cr 0.66, glu 111  LFT: Ca 8.3, tprot 6.5, alb 3.8, alkphos 108, AST 28, ALT 17, tbili 0.8  hs-TnI 37 ->33    - Imaging:  CTA C/A/P   Impression:  VASCULAR:   1. Postsurgical  changes of ascending thoracic aortic aneurysm and   Komerell's diverticulum repair with debranching of the 3 great   vessels and reimplantation along the anterior margin of the ascending   thoracic aorta. Additional postsurgical changes of right carotid   artery to right subclavian artery bypass synthetic graft. There is   suggestion with a dissection flap within this graft.   2. Significant interval enlargement of aneurysmal sac in the   ascending and descending thoracic aorta. There is contrast pooling   into the excluded aneurysmal sac, concerning for an endoleak within   limitations of a two phase study. In addition, the descending   thoracic aneurysmal sac is seen compressing the left atrium and left   ventricle. Vascular surgery consultation is recommended.   3. Interval enlargement in fusiform abdominal aortic aneurysm now   measuring up to 3.2 cm. Multifocal ulcerating atherosclerotic plaque   throughout the abdominal aorta.       CHEST ABDOMEN PELVIS:   1. New atrophic appearance of the right kidney in the setting of   severe segmental narrowing of the dominant right renal artery at its   origin is most compatible with ischemic injury.   2. New spiculated pulmonary nodule in the superior segment of left   lower lobe measuring 1.9 cm, concerning for malignancy. Consider   tissue sampling for further evaluation.   3. Additional incidental non-acute findings as detailed above.     CXR:  IMPRESSION:  1. No acute pulmonary pathology.  2. There is a 1.2 cm density in the left midlung. There is also a 1.3  cm density in the left lung base, though this could represent the left  nipple. These could represent masses or nodules. Recommend chest CT  for further evaluation.    CT Lumbar   IMPRESSION:  No acute lumbar fracture.  Fusiform aneurysm distal descending thoracic aorta is 6.4 x 5.3 cm.   Recommend cardiovascular surgery follow-up.  Disc osteophyte complex L5/S1 with mild central canal stenosis.  Mild  right-sided L3/4 neural foramen stenosis and mild left-sided  L5/S1 neural foramen stenosis due to osteophytes.    CT Thoracic   IMPRESSION:  1. There is mild compression of the superior endplate of T5. The age  of this is unclear.  2. There is a spiculated nodule at the left lower lobe. This is  suspicious for malignancy. Recommend chest CT for further evaluation.  3. There is aneurysmal dilatation of the entire thoracic aorta.    Cardiac Hx:  CTA H/N 2/2024  IMPRESSION:  No evidence for significant stenosis of the cervical vessels.      No evidence for significant stenosis or large branch vessel cutoffs  of the intracranial vessels.      No acute intracranial hemorrhage, mass effect or midline shift.      Nonspecific scattered white matter hypodensities favored to represent  sequela of small vessel ischemia.  If there is persistent clinical  concern for acute ischemia, consider further evaluation with MRI.      Redemonstrated aneurysmal dilatation of the visualized thoracic aorta  with the visualized portions of the ascending aorta measuring up 4.5  cm and the proximal aspect of the visualized descending aorta  measuring up to 5.1 cm. These findings have increased compared to  prior CT chest 06/20/2014.    CT soft tissue neck w con 7/2021  IMPRESSION:  No discrete abnormal neck mass is noted.     The limited CT images through the upper thorax demonstrate  postoperative changes compatible with a previous sternotomy. There is  again evidence of aneurysmal dilatation of the aortic arch with  calcified and noncalcified atherosclerotic plaque along the margins  of the aortic arch. There is again evidence of an area of partially  thrombus filled irregular focal aneurysmal dilatation along superior  margin of the aortic arch arising distal to the origin of the left  subclavian artery. On the current study, there is interval increased  vascular enhancement penetrating the thrombus within this focal area  of aneurysmal  dilatation when compared with 06/27/2017 currently  extending to the superior margin of the focal aneurysmal dilatation  as seen on coronal reconstructed slice 69 of 121 and sagittal  reconstructed slice 77 of 130. Further evaluation with a dedicated CT  angiogram of the chest would be recommended.    CT A/P wo contrast 6/2018  IMPRESSION:  1. Stable dimensions of aneurysm extending from the aortic arch to  the suprarenal abdominal aorta.  2. Stable subcentimeter ground-glass nodules of the lungs. Suggest  continued attention on follow-up    CTA C/A/P w contrast 6/2017   IMPRESSION:  1. Since the prior CTA abdomen pelvis of 09/11/2014 and CTA chest of  06/20/2014, there has been progressive aneurysmal dilatation of the  descending thoracic aorta which now measures 4.8 cm in caliber  downstream of the postoperative aortic arch, previously 4.3 cm. It  has decreased along its descent into the abdomen and now measures 6.0  cm at the diaphragmatic hiatus, previously 5.6 cm.  2. Stable postoperative surgical findings of ascending thoracic  Aortic aneurysm and Kommerell's diverticulum repair, with patency of  right carotid to subclavian artery bypass graft.  3. 7 mm posterior left lower lobe ground-glass pulmonary nodule. A 3  month follow up noncontrast chest CT is recommended to evaluate for  longitudinal persistence.  4. Additional biapical patchy areas of ground-glass attenuation are  present which are indeterminate but may be related to small airways  inflammation/infection.  5. Status post cholecystectomy, hysterectomy.  6. Colonic diverticulosis.     PMH: per above    SurgHx:   Past Surgical History:   Procedure Laterality Date    APPENDECTOMY  03/12/2014    Appendectomy    CT ABDOMEN PELVIS ANGIOGRAM W AND/OR WO IV CONTRAST  6/6/2013    CT ABDOMEN PELVIS ANGIOGRAM W AND/OR WO IV CONTRAST 6/6/2013 [facility] ANCILLARY LEGACY    CT ABDOMEN PELVIS ANGIOGRAM W AND/OR WO IV CONTRAST  9/11/2014    CT ABDOMEN PELVIS ANGIOGRAM W  AND/OR WO IV CONTRAST 9/11/2014 [facility] EMERGENCY LEGACY    CT ABDOMEN PELVIS ANGIOGRAM W AND/OR WO IV CONTRAST  6/27/2017    CT ABDOMEN PELVIS ANGIOGRAM W AND/OR WO IV CONTRAST 6/27/2017 [facility] ANCILLARY LEGACY    CT ABDOMEN PELVIS ANGIOGRAM W AND/OR WO IV CONTRAST  1/27/2012    CT ABDOMEN PELVIS ANGIOGRAM W AND/OR WO IV CONTRAST 1/27/2012 [facility] ANCILLARY LEGACY    HYSTERECTOMY  03/12/2014    Hysterectomy    MR HEAD ANGIO WO IV CONTRAST  3/21/2012    MR HEAD ANGIO WO IV CONTRAST 3/21/2012 [facility] CLINICAL LEGACY    MR NECK ANGIO WO IV CONTRAST  3/21/2012    MR NECK ANGIO WO IV CONTRAST 3/21/2012 [facility] CLINICAL LEGACY    OTHER SURGICAL HISTORY  03/12/2014    Aortic Arch Dissection Repair    OTHER SURGICAL HISTORY  09/30/2014    Laparoscopic Cholecystectomy With Cholangiography    TONSILLECTOMY  03/12/2014    Tonsillectomy With Adenoidectomy    TOTAL SHOULDER ARTHROPLASTY  03/12/2014    Shoulder Arthroplasty Total Shoulder Replacement      Allergies:   Allergies   Allergen Reactions    Penicillins Other, Unknown, Swelling and Rash      SocHx: lives alone   FamHx:  Family History   Problem Relation Name Age of Onset    Other (cardiac disorder) Father      Asthma Father          Home Medications:  Current Facility-Administered Medications on File Prior to Encounter   Medication Dose Route Frequency Provider Last Rate Last Admin    [COMPLETED] cyclobenzaprine (Flexeril) tablet 5 mg  5 mg oral Once Carly Philippe MD   5 mg at 12/09/24 1439    [COMPLETED] iohexol (OMNIPaque) 350 mg iodine/mL solution 90 mL  90 mL intravenous Once in imaging Carly Philippe MD   90 mL at 12/09/24 1617    [COMPLETED] ketorolac (Toradol) injection 15 mg  15 mg intravenous Once Carly Philippe MD   15 mg at 12/09/24 1439    [COMPLETED] labetaloL (Normodyne,Trandate) injection 10 mg  10 mg intravenous Once PRN Carly Philippe MD   10 mg at 12/09/24 2128    [COMPLETED] labetaloL (Normodyne,Trandate) injection 10 mg  10 mg intravenous Once  Carly Philippe MD   10 mg at 24    [COMPLETED] labetaloL (Normodyne,Trandate) injection 20 mg  20 mg intravenous Once Carly Philippe MD   20 mg at 248    [COMPLETED] morphine injection 4 mg  4 mg intravenous Once Carly Philippe MD   4 mg at 24 1534     Current Outpatient Medications on File Prior to Encounter   Medication Sig Dispense Refill    aspirin 81 mg EC tablet Take 1 tablet (81 mg) by mouth once daily.      atorvastatin (Lipitor) 20 mg tablet TAKE ONE TABLET BY MOUTH EVERY DAY 90 tablet 0    denosumab (Prolia) 60 mg/mL syringe Inject 1 mL (60 mg total) under the skin every 6 months. 1 mL 1    multivit-min/ferrous fumarate (MULTI VITAMIN ORAL) Take by mouth early in the morning..          Objective:    24 Hour Vitals  Temp:  [36.6 °C (97.8 °F)] 36.6 °C (97.8 °F)  Heart Rate:  [54-85] 57  Resp:  [14-23] 15  BP: (113-173)/() 115/70    Temp (24hrs), Av.6 °C (97.8 °F), Min:36.6 °C (97.8 °F), Max:36.6 °C (97.8 °F)     24 hour Intake/Output  No intake or output data in the 24 hours ending 12/10/24 0029     Exam:  General: Resting in bed. Well developed, well nourished.  HEENT: Normocephalic and atraumatic  Cardiovascular: RRR  Pulmonary: Lungs clear to auscultation bilaterally. 2L NC   GI: soft, non-tender, non-distended  Extremities: No LE edema   Skin: warm and dry. No rashes or lesions   Neurologic: CN II-XII grossly intact. No focal neurologic deficit   Psych: Appropriate mood and affect     Labs  Results for orders placed or performed during the hospital encounter of 24 (from the past 24 hours)   ECG 12 lead   Result Value Ref Range    Ventricular Rate 72 BPM    Atrial Rate 72 BPM    SC Interval 134 ms    QRS Duration 90 ms    QT Interval 426 ms    QTC Calculation(Bazett) 466 ms    P Axis 82 degrees    R Axis 77 degrees    T Axis 91 degrees    QRS Count 12 beats    Q Onset 226 ms    P Onset 159 ms    P Offset 212 ms    T Offset 439 ms    QTC Fredericia  452 ms   CBC and Auto Differential   Result Value Ref Range    WBC 9.7 4.4 - 11.3 x10*3/uL    nRBC 0.0 0.0 - 0.0 /100 WBCs    RBC 4.40 4.00 - 5.20 x10*6/uL    Hemoglobin 13.6 12.0 - 16.0 g/dL    Hematocrit 41.2 36.0 - 46.0 %    MCV 94 80 - 100 fL    MCH 30.9 26.0 - 34.0 pg    MCHC 33.0 32.0 - 36.0 g/dL    RDW 12.2 11.5 - 14.5 %    Platelets 131 (L) 150 - 450 x10*3/uL    Neutrophils % 89.6 40.0 - 80.0 %    Immature Granulocytes %, Automated 0.4 0.0 - 0.9 %    Lymphocytes % 5.8 13.0 - 44.0 %    Monocytes % 3.9 2.0 - 10.0 %    Eosinophils % 0.1 0.0 - 6.0 %    Basophils % 0.2 0.0 - 2.0 %    Neutrophils Absolute 8.66 (H) 1.60 - 5.50 x10*3/uL    Immature Granulocytes Absolute, Automated 0.04 0.00 - 0.50 x10*3/uL    Lymphocytes Absolute 0.56 (L) 0.80 - 3.00 x10*3/uL    Monocytes Absolute 0.38 0.05 - 0.80 x10*3/uL    Eosinophils Absolute 0.01 0.00 - 0.40 x10*3/uL    Basophils Absolute 0.02 0.00 - 0.10 x10*3/uL   Comprehensive metabolic panel   Result Value Ref Range    Glucose 111 (H) 74 - 99 mg/dL    Sodium 139 136 - 145 mmol/L    Potassium 4.0 3.5 - 5.3 mmol/L    Chloride 103 98 - 107 mmol/L    Bicarbonate 27 21 - 32 mmol/L    Anion Gap 13 10 - 20 mmol/L    Urea Nitrogen 12 6 - 23 mg/dL    Creatinine 0.66 0.50 - 1.05 mg/dL    eGFR 85 >60 mL/min/1.73m*2    Calcium 8.3 (L) 8.6 - 10.3 mg/dL    Albumin 3.8 3.4 - 5.0 g/dL    Alkaline Phosphatase 108 33 - 136 U/L    Total Protein 6.5 6.4 - 8.2 g/dL    AST 28 9 - 39 U/L    Bilirubin, Total 0.8 0.0 - 1.2 mg/dL    ALT 17 7 - 45 U/L   Troponin I, High Sensitivity, Initial   Result Value Ref Range    Troponin I, High Sensitivity 37 (H) 0 - 13 ng/L   Urinalysis with Reflex Culture and Microscopic   Result Value Ref Range    Color, Urine Light-Yellow Light-Yellow, Yellow, Dark-Yellow    Appearance, Urine Clear Clear    Specific Gravity, Urine 1.020 1.005 - 1.035    pH, Urine 6.5 5.0, 5.5, 6.0, 6.5, 7.0, 7.5, 8.0    Protein, Urine 10 (TRACE) NEGATIVE, 10 (TRACE), 20 (TRACE) mg/dL     Glucose, Urine Normal Normal mg/dL    Blood, Urine NEGATIVE NEGATIVE    Ketones, Urine 60 (2+) (A) NEGATIVE mg/dL    Bilirubin, Urine NEGATIVE NEGATIVE    Urobilinogen, Urine Normal Normal mg/dL    Nitrite, Urine NEGATIVE NEGATIVE    Leukocyte Esterase, Urine NEGATIVE NEGATIVE   Urinalysis Microscopic   Result Value Ref Range    WBC, Urine 1-5 1-5, NONE /HPF    RBC, Urine NONE NONE, 1-2, 3-5 /HPF    Squamous Epithelial Cells, Urine 1-9 (SPARSE) Reference range not established. /HPF    Mucus, Urine FEW Reference range not established. /LPF   Troponin, High Sensitivity, 1 Hour   Result Value Ref Range    Troponin I, High Sensitivity 33 (H) 0 - 13 ng/L           Imaging  No orders to display        Medications   Scheduled Medications  aspirin, 81 mg, oral, Daily  atorvastatin, 20 mg, oral, Daily     Continuous Medications  labetalol, 0.5-2 mg/min     PRN Medications         Assessment/Plan:  89 yo F with a PMHx of HLD, CAD (hx of multivessel disease in 2012?), hx of thoracic ascending aortic aneurysm (4.5cm), descending thoracic aortic aneurysm (5.1 cm), TAAA s/p R carotid to R subclavian bypass and arch repair for ascending aneurysm and Kommerl's diverticulum repair in 2012 presenting to [facility] ED for back pain. 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 Admitted to the CICU for impulse control.    NEUROLOGIC  -FABIOLA     CARDIOVASCULAR  #Thoracic Aortic Aneurysm   #AAA  #Thoracic Endoleak   ::hx of thoracic ascending aortic aneurysm (4.5cm), descending thoracic aortic aneurysm (5.1 cm)  ::hx of R carotid to R subclavian bypass and arch repair for ascending aneurysm and Kommerl's diverticulum repair in 2012  ::pt previously had declined further work up and evaluation of her known thoracic aortic aneurysms   ::CTA chest/abdomen/pelvis notable for dissection flap of R carotid  to R subclavian artery bypass graft, interval enlargement of ascending and descending thoracic aortic aneurysmal sac with concern for endoleak. The descending thoracic aneurysm found to be compression the left artium and left ventricle.   ::Interval enlargement of fusiform abdominal aortic aneurysm measuring 3.2 cm.   ::s/p IV labetalol x2 in the ED  -Lactate 2.4 , will give 500 ml LR bolus and trend lactate   -Consult vascular surgery   -Start Impulse control with labetalol gtt goal SBP <120, HR < 60   -Formal TTE in the AM   -Monitor on tele   -Keep NPO     #CAD   -C/w asa 81 mg, atorvastatin 20 mg     PULMONARY  #Pulmonary nodules  ::CTA chest notable for new spiculated pulmonary nodule in the superior segment of left  lower lobe measuring 1.9 cm, concerning for malignancy  ::CXR notable for 1.2 cm density in the left midlung and possible 1.3cm density in the left lung base   -Consider pulm vs IR consult in the AM for biopsy     RENAL/  #Atrophic R kidney   ::CTA notable for new atrophic appearance of the right kidney in the setting of severe segmental narrowing of the dominant right renal artery  -Monitor renal function and UOP     GASTROINTESTINAL  -FABIOLA    ENDOCRINE  -FABIOLA     HEME/ONC  -FABIOLA     INFECTIOUS DISEASE  -FABIOLA    MSK/DERM  #Mild Compression of T5  -Seen on CT T spine   -Tylenlol PRN for pain     F: PRN  E: K>4, Mg>2  N: NPO  A: Piv, antonio    Oxygen: none   Abx: none  Gtts: labetalol    DVT ppx: holding iso aortic endoleak   GI ppx: none     Code Status: DNR/DNI (confirmed on admission)  Surrogate Medical Decision-maker:  Daughter Ying Cutler [phone]    Patient to be staffed with attending in the AM. Plan discussed with cardiology fellow.

## 2024-12-10 NOTE — CONSULTS
Vascular Surgery CONSULT NOTE    Assessment/Plan   Catarina Cutler is 89 y.o. female with history of HLD, CAD, TAAA s/p R carotid to R subclavian bypass and arch repair for ascending aneurysm and Kommerl's diverticulum repair in 2012 (Dr. Farris and Dr. Gerardo) who presented to [facility] yesterday with back pain that woke her up from sleep at 2am on 12/9. CT shows interval enlargement of aneurysm sac in ascending and descending aorta and possible endoleak. Her pain has improved with BP control. Currently on labetalol gtt.     Plan:  Continue impulse control   Remainder of plan pending further review of imaging     D/w Dr. Percy Gonsalves  PGY2 General Surgery   Vascular Surgery       Subjective   HPI:  Catarina Cutler is 89 y.o. female with history of HLD, CAD, TAAA s/p R carotid to R subclavian bypass and arch repair for ascending aneurysm and Kommerl's diverticulum repair in 2012 (Dr. Farris and Dr. Gerardo) who presented to [facility] yesterday with back pain that woke her up from sleep at 2am on 12/9. She has chronic back pain but this was much more severe compared to her baseline pain. Has had nausea since the onset of back pain but no vomiting. Denies fevers, chills, change in bowel habits.     Vascular History:  R carotid to R subclavian bypass and arch repair for ascending aneurysm and Kommerl's diverticulum    PMH/PSH:   HLD, CAD, TAAA, sensorineural hearing loss, R breast cyst, osteoporosis    R carotid to R subclavian bypass and arch repair for ascending aneurysm and Kommerl's diverticulum  Appendectomy  Hysterectomy  Laparoscopic cholecystectomy with IOC   Tonsillectomy  Total shoulder replacement     Home Meds:  Current Facility-Administered Medications on File Prior to Encounter   Medication Dose Route Frequency Provider Last Rate Last Admin    [COMPLETED] cyclobenzaprine (Flexeril) tablet 5 mg  5 mg oral Once Carly Philippe MD   5 mg at 12/09/24 3575    [COMPLETED] iohexol  "(OMNIPaque) 350 mg iodine/mL solution 90 mL  90 mL intravenous Once in imaging Carly Philippe MD   90 mL at 12/09/24 1617    [COMPLETED] ketorolac (Toradol) injection 15 mg  15 mg intravenous Once Carly Philippe MD   15 mg at 12/09/24 1439    [COMPLETED] labetaloL (Normodyne,Trandate) injection 10 mg  10 mg intravenous Once PRN Carly Philippe MD   10 mg at 12/09/24 2128    [COMPLETED] labetaloL (Normodyne,Trandate) injection 10 mg  10 mg intravenous Once Carly Philippe MD   10 mg at 12/09/24 2206    [COMPLETED] labetaloL (Normodyne,Trandate) injection 20 mg  20 mg intravenous Once Carly Philippe MD   20 mg at 12/09/24 2258    [COMPLETED] morphine injection 4 mg  4 mg intravenous Once Carly Philippe MD   4 mg at 12/09/24 1534     Current Outpatient Medications on File Prior to Encounter   Medication Sig Dispense Refill    aspirin 81 mg EC tablet Take 1 tablet (81 mg) by mouth once daily.      atorvastatin (Lipitor) 20 mg tablet TAKE ONE TABLET BY MOUTH EVERY DAY 90 tablet 0    denosumab (Prolia) 60 mg/mL syringe Inject 1 mL (60 mg total) under the skin every 6 months. 1 mL 1    multivit-min/ferrous fumarate (MULTI VITAMIN ORAL) Take by mouth early in the morning..          Allergies:  Allergies   Allergen Reactions    Penicillins Other, Unknown, Swelling and Rash       SH/FH:   Former smoker  Occasional ETOH   No drug use    ROS: 12 system negative except HPI    Objective   Vitals:  Heart Rate:  [54-85]   Temp:  [36.6 °C (97.8 °F)]   Resp:  [14-23]   BP: (113-173)/()   Height:  [154.9 cm (5' 1\")]   Weight:  [40.8 kg (90 lb)]   SpO2:  [88 %-100 %]     Exam:  Constitutional: No acute distress, resting comfortably  Neuro:  AOx3, grossly intact  ENMT: moist mucous membranes  Head/neck: atraumatic  CV: no tachycardia  Pulm: non-labored on room air  GI: soft, non-tender, non-distended  Skin: warm and dry  Musculoskeletal: moving all extremities  Extremities:   WWP  A line in L radial "   Palpable R radial  Palpable DP bilaterally     LUE BP - 121/81  RUE BP - 117/100    Labs:  Results from last 7 days   Lab Units 12/10/24  0018 12/09/24  1439   WBC AUTO x10*3/uL 10.2 9.7   HEMOGLOBIN g/dL 13.2 13.6   PLATELETS AUTO x10*3/uL 174 131*      Results from last 7 days   Lab Units 12/10/24  0018 12/09/24  1439   SODIUM mmol/L 137 139   POTASSIUM mmol/L 4.1 4.0   CHLORIDE mmol/L 101 103   CO2 mmol/L 26 27   BUN mg/dL 12 12   CREATININE mg/dL 0.79 0.66   GLUCOSE mg/dL 118* 111*   MAGNESIUM mg/dL 2.10  --       Results from last 7 days   Lab Units 12/10/24  0018   INR  1.0   PROTIME seconds 11.7   APTT seconds 23*           Imaging:  Reviewed independently by vascular team:  IMPRESSION:  VASCULAR:  1. Postsurgical changes of ascending thoracic aortic aneurysm and  Komerell's diverticulum repair with debranching of the 3 great  vessels and reimplantation along the anterior margin of the ascending  thoracic aorta. Additional postsurgical changes of right carotid  artery to right subclavian artery bypass synthetic graft. There is  suggestion with a dissection flap within this graft.  2. Significant interval enlargement of aneurysmal sac in the  ascending and descending thoracic aorta. There is contrast pooling  into the excluded aneurysmal sac, concerning for an endoleak within  limitations of a two phase study. In addition, the descending  thoracic aneurysmal sac is seen compressing the left atrium and left  ventricle. Vascular surgery consultation is recommended.  3. Interval enlargement in fusiform abdominal aortic aneurysm now  measuring up to 3.2 cm. Multifocal ulcerating atherosclerotic plaque  throughout the abdominal aorta.      CHEST ABDOMEN PELVIS:  1. New atrophic appearance of the right kidney in the setting of  severe segmental narrowing of the dominant right renal artery at its  origin is most compatible with ischemic injury.  2. New spiculated pulmonary nodule in the superior segment of  left  lower lobe measuring 1.9 cm, concerning for malignancy. Consider  tissue sampling for further evaluation.  3. Additional incidental non-acute findings as detailed above.

## 2024-12-10 NOTE — PROGRESS NOTES
Emergency Medicine Transition of Care Note.    I received Catarina Cutler in signout from Dr. Philippe.  Please see the previous ED provider note for all HPI, PE and MDM up to the time of signout. This is in addition to the primary record.    In brief Catarina Cutler is an 89 y.o. female presenting for   Chief Complaint   Patient presents with    Back Pain     At the time of signout we were awaiting: Transport    ED Course as of 12/09/24 2256   Mon Dec 09, 2024   1426 ECG read interpreted by me.  Sinus rhythm marked sinus arrhythmia, rate 72.  Normal axis.  Normal intervals.  No ST or T wave derangements. [CG]   1448 CXR 1. No acute pulmonary pathology.  2. There is a 1.2 cm density in the left midlung. There is also a 1.3 cm density in the left lung base, though this could represent the left  nipple. These could represent masses or  odules. Recommend chest CT for further evaluation.   [CG]   1450 CT TS 1. There is mild compression of the superior endplate of T5. The age of this is unclear.  2. There is a spiculated nodule at the left lower lobe. This is suspicious for malignancy. Recommend chest CT for further evaluation.  3. There is aneurysmal dilatation of the entire thoracic aorta. Recommend CTA for further evaluation.   [CG]   1505 CTA ordered to assess for malignancy, aortic dissection [CG]   1628 Trops mildly elevated but stable.  Labs otherwise unremarkable except for mild thrombocytopenia 131. [CG]   1715 CT LS No acute lumbar fracture. Fusiform aneurysm distal descending thoracic aorta is 6.4 x 5.3 cm.   Recommend cardiovascular surgery follow-up. Disc osteophyte complex L5/S1 with mild central canal stenosis. Mild right-sided L3/4 neural foramen stenosis and mild left-sided L5/S1 neural foramen stenosis due to osteophytes. [CG]   1814 Per radiology pt's thoracic aneurysm is significantly enlarged with concerns for endoleak. Vasc surgery consult is highly recommended  Pt also has R carotid to R  subclavian artery bypass graft, there looks to be a dissection flap within this graft   [CG]   1819 Per patient she feels better but is still in pain. She states she had some type of aneursym repaired 9 years ago [facility] [[facility]   1838 Spoke with Dr. Velarde vascular surgery who recommends transfer to CICU through aortic activation [CG]   1904 Spoke with aortic team who agrees to transfer to CICU for expedited evaluation. She recommends labetalol for goal , HR <60.  [CG]      ED Course User Index  [CG] Carly Philippe MD         Diagnoses as of 12/09/24 2502   Thoracic aortic aneurysm (TAA), unspecified part, unspecified whether ruptured (CMS-HCC)   Closed wedge compression fracture of T5 vertebra, initial encounter (Multi)       Medical Decision Making  Systolic less than 120 and heart rate less than 60.  Was given a dose of labetalol before transport downtown.    Final diagnoses:   [I71.20] Thoracic aortic aneurysm (TAA), unspecified part, unspecified whether ruptured (CMS-HCC)   [S22.050A] Closed wedge compression fracture of T5 vertebra, initial encounter (Multi)           Procedure  Procedures    Sudhakar Moncada MD

## 2024-12-10 NOTE — PROGRESS NOTES
"Cardiac ICU Progress Note, 12/10/2024    Admit Date: 12/10/2024   Hospital Length of Stay: 0   ICU Length of Stay: 6h     History of Present Illness  Catarina Cutler is a 89 y.o. female on day 6h of admission for Aortic aneurysm, unspecified portion of aorta, unspecified whether ruptured (CMS-[facility]).    Interval History  Admitted to CICU ON   Repeat Lactate 0.9 this AM     Subjective  Pt seen and evaluated at bedside     At time of eval pt notes pain of lower back which seemed to wrap around to front     Denied chest pain, chest tightness/pressure, SOB, abd pain, N/V/D/C, LE edema     Pt states given her age she does not wish to undergo very invasive major surgery and that given her age, she states \"if it's my time then I'll go\". It was discussed with her that she may have risk of death from condition to which she understood and reiterated that she does not which to undergo major surgical repair. However may be amenable to endovascular repair if that is an option, once discussed in more detail with Vascular Surgery team.     Objective    Vitals  Visit Vitals  BP (!) 93/47   Pulse 59   Temp 35.9 °C (96.6 °F) (Temporal)   Resp 15        Vent settings    Most Recent Range Past 24hrs   Mode      FiO2   No data recorded   Rate   No data recorded   Vt    No data recorded   PEEP   No data recorded       Invasive Hemodynamics   Most Recent Range Past 24hrs   BP (Art) 129/61 Arterial Line BP 1  Min: 108/49  Max: 129/61   MAP(Art) 85 mmHg Arterial Line MAP 1 (mmHg)   Min: 70 mmHg  Max: 85 mmHg   RA/CVP   No data recorded   PA   No data recorded   PA(mean)   No data recorded   PCWP   No data recorded   CO   No data recorded   CI   No data recorded   Mixed Venous   No data recorded   SVR    No data recorded   PVR   No data recorded     I/O  No intake or output data in the 24 hours ending 12/10/24 0700     Physical Exam  Physical Exam  Constitutional:       General: She is awake. She is not in acute distress.     Appearance: " She is not ill-appearing.   HENT:      Head: Normocephalic and atraumatic.      Nose: Nose normal.      Mouth/Throat:      Mouth: Mucous membranes are dry.   Cardiovascular:      Rate and Rhythm: Normal rate and regular rhythm.      Pulses: Normal pulses.           Dorsalis pedis pulses are 2+ on the right side and 2+ on the left side.      Heart sounds: Normal heart sounds, S1 normal and S2 normal.      Arteriovenous access: Right arteriovenous access is present.  Pulmonary:      Effort: Pulmonary effort is normal. No accessory muscle usage or respiratory distress.      Breath sounds: Normal breath sounds.   Abdominal:      General: Abdomen is flat. Bowel sounds are normal. There is no distension or abdominal bruit.      Palpations: Abdomen is soft.      Tenderness: There is no abdominal tenderness.      Comments: No visible pulsations    Musculoskeletal:      Right lower leg: No edema.      Left lower leg: No edema.   Skin:     General: Skin is warm and dry.   Neurological:      Mental Status: She is alert and oriented to person, place, and time.   Psychiatric:         Behavior: Behavior is cooperative.         Labs    Results for orders placed or performed during the hospital encounter of 12/10/24 (from the past 24 hours)   CBC   Result Value Ref Range    WBC 10.2 4.4 - 11.3 x10*3/uL    nRBC 0.0 0.0 - 0.0 /100 WBCs    RBC 4.29 4.00 - 5.20 x10*6/uL    Hemoglobin 13.2 12.0 - 16.0 g/dL    Hematocrit 39.1 36.0 - 46.0 %    MCV 91 80 - 100 fL    MCH 30.8 26.0 - 34.0 pg    MCHC 33.8 32.0 - 36.0 g/dL    RDW 12.3 11.5 - 14.5 %    Platelets 174 150 - 450 x10*3/uL   Type and screen   Result Value Ref Range    ABO TYPE A     Rh TYPE POS     ANTIBODY SCREEN NEG    Coagulation Screen   Result Value Ref Range    Protime 11.7 9.8 - 12.8 seconds    INR 1.0 0.9 - 1.1    aPTT 23 (L) 27 - 38 seconds   Comprehensive metabolic panel   Result Value Ref Range    Glucose 118 (H) 74 - 99 mg/dL    Sodium 137 136 - 145 mmol/L    Potassium  4.1 3.5 - 5.3 mmol/L    Chloride 101 98 - 107 mmol/L    Bicarbonate 26 21 - 32 mmol/L    Anion Gap 14 10 - 20 mmol/L    Urea Nitrogen 12 6 - 23 mg/dL    Creatinine 0.79 0.50 - 1.05 mg/dL    eGFR 73 >60 mL/min/1.73m*2    Calcium 8.6 8.6 - 10.6 mg/dL    Albumin 3.8 3.4 - 5.0 g/dL    Alkaline Phosphatase 104 33 - 136 U/L    Total Protein 6.6 6.4 - 8.2 g/dL    AST 29 9 - 39 U/L    Bilirubin, Total 0.9 0.0 - 1.2 mg/dL    ALT 17 7 - 45 U/L   Magnesium   Result Value Ref Range    Magnesium 2.10 1.60 - 2.40 mg/dL   Troponin I, High Sensitivity   Result Value Ref Range    Troponin I, High Sensitivity (CMC) 27 0 - 34 ng/L   Lactate   Result Value Ref Range    Lactate 2.4 (H) 0.4 - 2.0 mmol/L   Lactate   Result Value Ref Range    Lactate 0.9 0.4 - 2.0 mmol/L        Cardiac Data    EKG  Encounter Date: 12/09/24   ECG 12 lead   Result Value    Ventricular Rate 72    Atrial Rate 72    MI Interval 134    QRS Duration 90    QT Interval 426    QTC Calculation(Bazett) 466    P Axis 82    R Axis 77    T Axis 91    QRS Count 12    Q Onset 226    P Onset 159    P Offset 212    T Offset 439    QTC Fredericia 452    Narrative    Sinus rhythm with marked sinus arrhythmia  Otherwise normal ECG  When compared with ECG of 17-SEP-2014 13:48,  No significant change was found      Echocardiogram  No echocardiogram results found for the past 12 months  Stress Testing  [id number] No results found for this or any previous visit from the past 1825 days.    Cardiac Catheterization  No results found for this or any previous visit from the past 1825 days.  No results found for this or any previous visit from the past 3650 days.    Cardiac Scoring  No results found for this or any previous visit from the past 1825 days.    AAA  No results found for this or any previous visit from the past 1825 days.    Other  No results found for this or any previous visit from the past 1825 days.      Imaging  CT angio chest abdomen pelvis    Result  Date: 12/9/2024  VASCULAR: 1. Postsurgical changes of ascending thoracic aortic aneurysm and Komerell's diverticulum repair with debranching of the 3 great vessels and reimplantation along the anterior margin of the ascending thoracic aorta. Additional postsurgical changes of right carotid artery to right subclavian artery bypass synthetic graft. There is suggestion with a dissection flap within this graft. 2. Significant interval enlargement of aneurysmal sac in the ascending and descending thoracic aorta. There is contrast pooling into the excluded aneurysmal sac, concerning for an endoleak within limitations of a two phase study. In addition, the descending thoracic aneurysmal sac is seen compressing the left atrium and left ventricle. Vascular surgery consultation is recommended. 3. Interval enlargement in fusiform abdominal aortic aneurysm now measuring up to 3.2 cm. Multifocal ulcerating atherosclerotic plaque throughout the abdominal aorta.   CHEST ABDOMEN PELVIS: 1. New atrophic appearance of the right kidney in the setting of severe segmental narrowing of the dominant right renal artery at its origin is most compatible with ischemic injury. 2. New spiculated pulmonary nodule in the superior segment of left lower lobe measuring 1.9 cm, concerning for malignancy. Consider tissue sampling for further evaluation. 3. Additional incidental non-acute findings as detailed above.     I personally reviewed the images/study and I agree with the findings as stated by Dr. Patel Curry. This study was interpreted at [facility], [place], [place].   MACRO: Patel Curry discussed the significance and urgency of this critical finding by epic secure chat with  ALYCIA PAEZ on 12/9/2024 at 5:51 pm.  (**-RCF-**) Findings:  See findings.   .   Signed by: Felipe Rajput 12/9/2024 6:17 PM Dictation workstation:   [id number]    CT lumbar spine wo IV contrast    Result Date: 12/9/2024  No acute  lumbar fracture. Fusiform aneurysm distal descending thoracic aorta is 6.4 x 5.3 cm. Recommend cardiovascular surgery follow-up. Disc osteophyte complex L5/S1 with mild central canal stenosis. Mild right-sided L3/4 neural foramen stenosis and mild left-sided L5/S1 neural foramen stenosis due to osteophytes. Signed by Klever Wright MD    CT thoracic spine wo IV contrast    Result Date: 12/9/2024  1. There is mild compression of the superior endplate of T5. The age of this is unclear. 2. There is a spiculated nodule at the left lower lobe. This is suspicious for malignancy. Recommend chest CT for further evaluation. 3. There is aneurysmal dilatation of the entire thoracic aorta. Recommend CTA for further evaluation. Signed by David Nicole MD    XR chest 1 view    Result Date: 12/9/2024  1. No acute pulmonary pathology. 2. There is a 1.2 cm density in the left midlung. There is also a 1.3 cm density in the left lung base, though this could represent the left nipple. These could represent masses or nodules. Recommend chest CT for further evaluation. Signed by David Nicole MD       Inpatient Medications    Continuous medications  labetalol, 0.5-2 mg/min        Scheduled medications  aspirin, 81 mg, oral, Daily  atorvastatin, 20 mg, oral, Daily        PRN medications  PRN medications: acetaminophen      Assessment & Plan  Catarina Cutler is a 89 y.o. female with a hx of HLD, CAD (hx of multivessel disease in 2012?), hx of thoracic ascending aortic aneurysm (4.5cm), descending thoracic aortic aneurysm (5.1 cm), TAAA s/p R carotid to R subclavian bypass and arch repair for ascending aneurysm and Kommerl's diverticulum repair in 2012 presenting to [facility] ED for back pain. 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 Admitted to the CICU for impulse control.   :: Found to  have new interval enlargement of ascending and descending thoracic aorta aneurysmal sac with possible endoleak and a 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   :: Currently on labetalol gtt for impulse control with goal SBP<120 (100s-110s), HR<60      Updates 12/10/24:   -Resumed labetalol gtt this AM. 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 The descending thoracic aneurysm found to be compression the left artium and left ventricle.   ::Interval enlargement of fusiform abdominal aortic aneurysm measuring 3.2 cm.   :: s/p IV labetalol x2 in the OSH ED (got total 30mg IV)   -Lactate 2.4  -> f/u Lactate 0.9 this AM -> repeat lactate at 14:00 today   -Started Impulse control with labetalol gtt goal SBP <120, HR < 60   -Formal TTE in the AM   -CTS consult placed, appreciate recs   -Currently on sips w/ chips while awaiting Vascular Surgery & CTS recs   -Avoiding chemical AC I/s/o c/f possible endoleak and possible enlargement of aortic aneurysm       #CAD   :: No prior echo on file   Plan:   -C/w ASA 81 mg, atorvastatin 20 mg   -Formal TTE ordered       Respiratory  #Pulmonary nodules  ::  CTA chest notable for new spiculated pulmonary nodule in the superior segment of left  lower lobe measuring 1.9 cm, concerning for malignancy  ::CXR notable for 1.2 cm density in the left midlung and possible 1.3cm density in the left lung base   Plan:  -Consider pulm vs IR consult in the AM for biopsy during this admission       Gastrointestinal  FABIOLA      Renal  #Atrophic R kidney   :: CTA C/A/P notable for new atrophic appearance of the right kidney in the setting of severe segmental narrowing of the dominant right renal artery  Plan:   -Monitor renal function and UOP       Hematologic  FABIOLA      Infectious Disease  FABIOLA      Endocrine  FABIOLA      MSK  #Mild Compression of T5   #OA of spine  :: Seen on CT of T-spine  :: Osteophytes also seen on CT scan of spine  Plan:  -Tylenol PRN for pain   -Oxy 5mg PRN for pain   -Lidocaine patch for pain       Fluids: PRN  Electrolytes: PRN  Nutrition: Sips w/ chips   Lines: PRN  DVT prophylaxis: SCDs  GI prophylaxis:   Gtt: labetalol    Code Status: DNR and No Intubation (Confirmed on admission)   Emergency Contact / NOK: Extended Emergency Contact Information  Primary Emergency Contact: ChristianeCarmina  Home Phone: [phone]  Mobile Phone: [phone]  Relation: Friend  Secondary Emergency Contact: Ying Mckeon  Mobile Phone: [phone]  Relation: Daughter        Janessa Lake MD  PGY-1 Internal Medicine     12/10/2024

## 2024-12-10 NOTE — CONSULTS
Reason for Consult: Endoleak   CARDIAC SURGERY CONSULT NOTE    HISTORY OF PRESENT ILLNESS  Catarina Cutler is 89 y.o. female with history of HLD, CAD, TAAA s/p R carotid to R subclavian bypass and arch repair for ascending aneurysm and Kommerl's diverticulum repair in 2012 (Dr. Farris and Dr. Gerardo) who presented to [facility] yesterday with back pain that woke her up from sleep at 2am on 12/9. CT shows interval enlargement of aneurysm sac in ascending and descending aorta and possible endoleak.    Cardiac surgery consulted along with vascular surgery for surgical evaluation.    Cardiac/Pertinent Imaging  CTA C/A/P 12/9/24  IMPRESSION: VASCULAR:  1. Postsurgical changes of ascending thoracic aortic aneurysm and  Komerell's diverticulum repair with debranching of the 3 great  vessels and reimplantation along the anterior margin of the ascending  thoracic aorta. Additional postsurgical changes of right carotid  artery to right subclavian artery bypass synthetic graft. There is  suggestion with a dissection flap within this graft.  2. Significant interval enlargement of aneurysmal sac in the  ascending and descending thoracic aorta. There is contrast pooling  into the excluded aneurysmal sac, concerning for an endoleak within  limitations of a two phase study. In addition, the descending  thoracic aneurysmal sac is seen compressing the left atrium and left  ventricle. Vascular surgery consultation is recommended.  3. Interval enlargement in fusiform abdominal aortic aneurysm now  measuring up to 3.2 cm. Multifocal ulcerating atherosclerotic plaque  throughout the abdominal aorta.      CHEST ABDOMEN PELVIS:  1. New atrophic appearance of the right kidney in the setting of  severe segmental narrowing of the dominant right renal artery at its  origin is most compatible with ischemic injury.  2. New spiculated pulmonary nodule in the superior segment of left  lower lobe measuring 1.9 cm, concerning for malignancy.  Consider  tissue sampling for further evaluation.  3. Additional incidental non-acute findings as detailed above.    TTE 12/10/24: CONCLUSIONS:   1. Left ventricular ejection fraction is normal, by visual estimate at 65-70%.   2. Spectral Doppler shows an abnormal pattern of left ventricular diastolic filling.   3. No left ventricular thrombus visualized.   4. There is normal right ventricular global systolic function.   5. Mild to moderate tricuspid regurgitation visualized.   6. Right ventricular systolic pressure is within normal limits.   7. Descending aorta is severely dilated ( at least 5 cm) and is compressing the LA ) The ascending aorta is s/p aneurysm and Konnerl's diverticulum repair. Not well seen, though size of aortic root appears to be normal as does size of proximal ascending aorta. Abominal aortic anatomy in the subcostal view is unclear. Recommend further assessment with CTA to better understand the abdominal aortic pathology.   8. Findings discussed with the primary service at the time of reporting.   9. No prior echocardiogram available for comparison.    Subjective   Past Medical History:   Diagnosis Date    Other specified health status 06/24/2015    No pertinent past medical history     Past Surgical History:   Procedure Laterality Date    APPENDECTOMY  03/12/2014    Appendectomy    CT ABDOMEN PELVIS ANGIOGRAM W AND/OR WO IV CONTRAST  6/6/2013    CT ABDOMEN PELVIS ANGIOGRAM W AND/OR WO IV [place] [facility] [place]    CT ABDOMEN PELVIS ANGIOGRAM W AND/OR WO IV CONTRAST  9/11/2014    CT ABDOMEN PELVIS ANGIOGRAM W AND/OR WO IV CONTRAST 9/11/2014 [facility] EMERGENCY LEGACY    CT ABDOMEN PELVIS ANGIOGRAM W AND/OR WO IV CONTRAST  6/27/2017    CT ABDOMEN PELVIS ANGIOGRAM W AND/OR WO IV CONTRAST 6/27/2017 [facility]CILLARY LEGAC[facility]    CT ABDOMEN PELVIS ANGIOGRAM W AND/OR WO IV CONTRAST  1/27/2012    CT ABDOMEN PELVIS ANGIOGRAM W AND/OR WO IV CONTRAST [place] [facility] [place]    HYSTERECTOMY   "03/12/2014    Hysterectomy    MR HEAD ANGIO WO IV CONTRAST  3/21/2012    MR HEAD ANGIO WO IV CONTRAST 3/21/2012 [facility] CLINICAL LEGACY    MR NECK ANGIO WO IV CONTRAST  3/21/2012    MR NECK ANGIO WO IV CONTRAST 3/21/2012 [facility] CLINICAL LEGACY    OTHER SURGICAL HISTORY  03/12/2014    Aortic Arch Dissection Repair    OTHER SURGICAL HISTORY  09/30/2014    Laparoscopic Cholecystectomy With Cholangiography    TONSILLECTOMY  03/12/2014    Tonsillectomy With Adenoidectomy    TOTAL SHOULDER ARTHROPLASTY  03/12/2014    Shoulder Arthroplasty Total Shoulder Replacement     Social History     Tobacco Use    Smoking status: Former     Types: Cigarettes    Smokeless tobacco: Never   Substance Use Topics    Alcohol use: Never    Drug use: Never     Family History   Problem Relation Name Age of Onset    Other (cardiac disorder) Father      Asthma Father         Penicillins    Prior to Admission medications    Medication Sig Start Date End Date Taking? Authorizing Provider   aspirin 81 mg EC tablet Take 1 tablet (81 mg) by mouth once daily.    Historical Provider, MD   atorvastatin (Lipitor) 20 mg tablet TAKE ONE TABLET BY MOUTH EVERY DAY 9/19/24   Edilia Alex MD   denosumab (Prolia) 60 mg/mL syringe Inject 1 mL (60 mg total) under the skin every 6 months. 7/18/24   Edilia Alex MD   multivit-min/ferrous fumarate (MULTI VITAMIN ORAL) Take by mouth early in the morning..    Historical Provider, MD       Review of Systems  Review of Systems   Constitutional: Negative.    HENT: Negative.     Eyes: Negative.    Respiratory: Negative.     Cardiovascular: Negative.    Gastrointestinal: Negative.    Endocrine: Negative.    Genitourinary: Negative.    Musculoskeletal: Negative.            Objective   BP 91/57   Pulse 55   Temp 36.2 °C (97.2 °F)   Resp 21   Ht 1.549 m (5' 1\")   Wt 45.8 kg (100 lb 15.5 oz)   SpO2 98%   BMI 19.08 kg/m²   0-10 (Numeric) Pain Score: 5 - Moderate pain   Vitals:    12/10/24 1240   Weight: 45.8 kg (100 lb " 15.5 oz)          Intake/Output Summary (Last 24 hours) at 12/10/2024 1336  Last data filed at 12/10/2024 0930  Gross per 24 hour   Intake 11.5 ml   Output --   Net 11.5 ml       Physical Exam  Physical Exam  Constitutional:       General: She is not in acute distress.     Appearance: She is not ill-appearing or toxic-appearing.      Comments: Frail elderly female laying in bed   HENT:      Head: Normocephalic.      Mouth/Throat:      Mouth: Mucous membranes are moist.   Cardiovascular:      Rate and Rhythm: Normal rate and regular rhythm.      Heart sounds: No murmur heard.  Pulmonary:      Effort: Pulmonary effort is normal.      Breath sounds: Normal breath sounds.   Musculoskeletal:         General: Normal range of motion.      Cervical back: Neck supple.      Right lower leg: No edema.      Left lower leg: No edema.   Skin:     General: Skin is warm and dry.   Neurological:      General: No focal deficit present.      Mental Status: She is alert and oriented to person, place, and time.   Psychiatric:         Mood and Affect: Mood normal.         Behavior: Behavior normal.         Medications  Scheduled medications  aspirin, 81 mg, oral, Daily  atorvastatin, 20 mg, oral, Daily  carvedilol, 3.125 mg, oral, BID  lidocaine, 2 patch, transdermal, Daily  polyethylene glycol, 17 g, oral, Daily  sennosides, 1 tablet, oral, Nightly    Continuous medications  labetalol, 0.25-2 mg/min, Last Rate: Stopped (12/10/24 1235)    PRN medications  PRN medications: acetaminophen, oxyCODONE    Labs  Results for orders placed or performed during the hospital encounter of 12/10/24 (from the past 24 hours)   CBC   Result Value Ref Range    WBC 10.2 4.4 - 11.3 x10*3/uL    nRBC 0.0 0.0 - 0.0 /100 WBCs    RBC 4.29 4.00 - 5.20 x10*6/uL    Hemoglobin 13.2 12.0 - 16.0 g/dL    Hematocrit 39.1 36.0 - 46.0 %    MCV 91 80 - 100 fL    MCH 30.8 26.0 - 34.0 pg    MCHC 33.8 32.0 - 36.0 g/dL    RDW 12.3 11.5 - 14.5 %    Platelets 174 150 - 450  x10*3/uL   Type and screen   Result Value Ref Range    ABO TYPE A     Rh TYPE POS     ANTIBODY SCREEN NEG    Coagulation Screen   Result Value Ref Range    Protime 11.7 9.8 - 12.8 seconds    INR 1.0 0.9 - 1.1    aPTT 23 (L) 27 - 38 seconds   Comprehensive metabolic panel   Result Value Ref Range    Glucose 118 (H) 74 - 99 mg/dL    Sodium 137 136 - 145 mmol/L    Potassium 4.1 3.5 - 5.3 mmol/L    Chloride 101 98 - 107 mmol/L    Bicarbonate 26 21 - 32 mmol/L    Anion Gap 14 10 - 20 mmol/L    Urea Nitrogen 12 6 - 23 mg/dL    Creatinine 0.79 0.50 - 1.05 mg/dL    eGFR 73 >60 mL/min/1.73m*2    Calcium 8.6 8.6 - 10.6 mg/dL    Albumin 3.8 3.4 - 5.0 g/dL    Alkaline Phosphatase 104 33 - 136 U/L    Total Protein 6.6 6.4 - 8.2 g/dL    AST 29 9 - 39 U/L    Bilirubin, Total 0.9 0.0 - 1.2 mg/dL    ALT 17 7 - 45 U/L   Magnesium   Result Value Ref Range    Magnesium 2.10 1.60 - 2.40 mg/dL   Troponin I, High Sensitivity   Result Value Ref Range    Troponin I, High Sensitivity (CMC) 27 0 - 34 ng/L   Lactate   Result Value Ref Range    Lactate 2.4 (H) 0.4 - 2.0 mmol/L   Electrocardiogram, 12-lead PRN ACS symptoms   Result Value Ref Range    Ventricular Rate 56 BPM    Atrial Rate 56 BPM    OH Interval 130 ms    QRS Duration 90 ms    QT Interval 504 ms    QTC Calculation(Bazett) 486 ms    P Axis 85 degrees    R Axis 70 degrees    T Axis 92 degrees    QRS Count 9 beats    Q Onset 227 ms    P Onset 162 ms    P Offset 215 ms    T Offset 479 ms    QTC Fredericia 493 ms   Lactate   Result Value Ref Range    Lactate 0.9 0.4 - 2.0 mmol/L   Transthoracic Echo (TTE) Complete   Result Value Ref Range    AV pk marko 1.21 m/s    LVOT diam 1.70 cm    MV E/A ratio 1.22     LA vol index A/L 19.3 ml/m2    Tricuspid annular plane systolic excursion 2.1 cm    LV EF 68 %    RV free wall pk S' 13.20 cm/s    LVIDd 3.60 cm    RVSP 30.9 mmHg    Aortic Valve Area by Continuity of Peak Velocity 1.47 cm2    AV pk grad 6 mmHg    LV A4C EF 72.5                 ASSESSMENT & PLAN  Catarina Cutler is 89 y.o. female with history of HLD, CAD, TAAA s/p R carotid to R subclavian bypass and arch repair for ascending aneurysm and Kommerl's diverticulum repair in 2012 (Dr. Farris and Dr. Gerardo) who presented to [facility] yesterday with back pain that woke her up from sleep at 2am on 12/9. CT shows interval enlargement of aneurysm sac in ascending and descending aorta and possible endoleak.    Cardiac surgery consulted along with vascular surgery for surgical evaluation.    RECOMMENDATIONS/PLAN  Dr. Joel aware of patient, currently reviewing case and available imaging.   Patient is adamant that she does not want any open surgical procedure, but she is open to hearing about endovascular options.   - CTA 12/9/24 in EMR  - TTE done 12/10/24  - Continue impulse control.   - Further recs pending surgeon review.       Will continue to follow along.  Thank you for the consultation.   Please page the cardiac surgery consult pager [phone] with any questions or changes in patient condition.    Lyssa Dohetry PA-C  Cardiac Surgery Consult JULIANA  [facility]  Cardiac Surgery Consult Pager [phone]     12/10/2024  1:36 PM

## 2024-12-10 NOTE — PROGRESS NOTES
"Pharmacy Medication History Review    Catarina Cutler is a 89 y.o. female admitted for Aortic aneurysm, unspecified portion of aorta, unspecified whether ruptured ([facility]). Pharmacy reviewed the patient's [id number] medications and allergies for accuracy.    Medications ADDED:  None   Medications CHANGED:  None   Medications REMOVED:   Multivitamin      The list below reflects the updated PTA list.   Prior to Admission Medications   Prescriptions Last Dose Informant   aspirin 81 mg EC tablet  Self   Sig: Take 1 tablet (81 mg) by mouth once daily.   atorvastatin (Lipitor) 20 mg tablet  Self   Sig: TAKE ONE TABLET BY MOUTH EVERY DAY   Patient taking differently: Take 1 tablet (20 mg) by mouth once daily at bedtime.   denosumab (Prolia) 60 mg/mL syringe Not Taking Self   Sig: Inject 1 mL (60 mg total) under the skin every 6 months.   Patient not taking: Reported on 12/10/2024      Facility-Administered Medications: None        The list below reflects the updated allergy list. Please review each documented allergy for additional clarification and justification.  Allergies  Reviewed by Janessa Lake MD on 12/10/2024        Severity Reactions Comments    Penicillins Low Other, Unknown, Swelling, Rash             Patient accepts M2B at discharge.   [facility]: [facility]     Sources:   Patient interview - reports taking only aspirin 81 mg in the morning and atorvastatin 20 mg at night  Dispense hx  OARRS     Additional Comments:  None       Sohan Umanzor, PharmD  Transitions of Care Pharmacist  12/10/24 2:55 PM     Secure Chat preferred   If no response call [id number] or [employer] Video \"Med Rec\"   "

## 2024-12-10 NOTE — PROCEDURES
Arterial Line Insertion    Date/Time: 12/10/2024 2:00 AM    Performed by: Ailyn Dwyer MD  Authorized by: Ailyn Dwyer MD    Consent:     Consent obtained:  Written    Consent given by:  Patient    Risks, benefits, and alternatives were discussed: yes      Risks discussed:  Bleeding, infection, ischemia, pain and repeat procedure  Universal protocol:     Patient identity confirmed:  Arm band  Indications:     Indications: hemodynamic monitoring    Pre-procedure details:     Skin preparation:  Chlorhexidine    Preparation: Patient was prepped and draped in sterile fashion    Sedation:     Sedation type:  None  Anesthesia:     Anesthesia method:  Local infiltration    Local anesthetic:  Lidocaine 1% w/o epi  Procedure details:     Location:  L radial    Placement technique:  Ultrasound guided    Number of attempts:  2  Post-procedure details:     Post-procedure:  Sterile dressing applied and sutured    Procedure completion:  Tolerated well, no immediate complications  Comments:      The patient was prepped and draped in the usual sterile manner using chlorhexidine scrub. 1% lidocaine was used to numb the region. The left radial artery was palpated and successfully cannulated on the first pass. Pulsatile, arterial blood was visualized and the artery was then threaded using the Seldinger technique and a catheter was then sutured into place. Good wave-form was obtained. The patient tolerated the procedure well without any immediate complications. The area was cleaned and Tegaderm was applied.

## 2024-12-11 LAB
ALBUMIN SERPL BCP-MCNC: 3.3 G/DL (ref 3.4–5)
ALBUMIN SERPL BCP-MCNC: 3.3 G/DL (ref 3.4–5)
ANION GAP SERPL CALC-SCNC: 13 MMOL/L (ref 10–20)
ANION GAP SERPL CALC-SCNC: 14 MMOL/L (ref 10–20)
BASOPHILS # BLD AUTO: 0.02 X10*3/UL (ref 0–0.1)
BASOPHILS # BLD AUTO: 0.03 X10*3/UL (ref 0–0.1)
BASOPHILS NFR BLD AUTO: 0.3 %
BASOPHILS NFR BLD AUTO: 0.4 %
BUN SERPL-MCNC: 18 MG/DL (ref 6–23)
BUN SERPL-MCNC: 26 MG/DL (ref 6–23)
CALCIUM SERPL-MCNC: 8.4 MG/DL (ref 8.6–10.6)
CALCIUM SERPL-MCNC: 8.4 MG/DL (ref 8.6–10.6)
CHLORIDE SERPL-SCNC: 103 MMOL/L (ref 98–107)
CHLORIDE SERPL-SCNC: 99 MMOL/L (ref 98–107)
CO2 SERPL-SCNC: 25 MMOL/L (ref 21–32)
CO2 SERPL-SCNC: 27 MMOL/L (ref 21–32)
CREAT SERPL-MCNC: 0.86 MG/DL (ref 0.5–1.05)
CREAT SERPL-MCNC: 0.97 MG/DL (ref 0.5–1.05)
EGFRCR SERPLBLD CKD-EPI 2021: 57 ML/MIN/1.73M*2
EGFRCR SERPLBLD CKD-EPI 2021: 65 ML/MIN/1.73M*2
EOSINOPHIL # BLD AUTO: 0.12 X10*3/UL (ref 0–0.4)
EOSINOPHIL # BLD AUTO: 0.12 X10*3/UL (ref 0–0.4)
EOSINOPHIL NFR BLD AUTO: 1.7 %
EOSINOPHIL NFR BLD AUTO: 1.7 %
ERYTHROCYTE [DISTWIDTH] IN BLOOD BY AUTOMATED COUNT: 12.5 % (ref 11.5–14.5)
ERYTHROCYTE [DISTWIDTH] IN BLOOD BY AUTOMATED COUNT: 12.5 % (ref 11.5–14.5)
GLUCOSE SERPL-MCNC: 82 MG/DL (ref 74–99)
GLUCOSE SERPL-MCNC: 91 MG/DL (ref 74–99)
HCT VFR BLD AUTO: 36.3 % (ref 36–46)
HCT VFR BLD AUTO: 38.2 % (ref 36–46)
HGB BLD-MCNC: 12 G/DL (ref 12–16)
HGB BLD-MCNC: 12.4 G/DL (ref 12–16)
IMM GRANULOCYTES # BLD AUTO: 0.02 X10*3/UL (ref 0–0.5)
IMM GRANULOCYTES # BLD AUTO: 0.02 X10*3/UL (ref 0–0.5)
IMM GRANULOCYTES NFR BLD AUTO: 0.3 % (ref 0–0.9)
IMM GRANULOCYTES NFR BLD AUTO: 0.3 % (ref 0–0.9)
LYMPHOCYTES # BLD AUTO: 0.94 X10*3/UL (ref 0.8–3)
LYMPHOCYTES # BLD AUTO: 1.32 X10*3/UL (ref 0.8–3)
LYMPHOCYTES NFR BLD AUTO: 13.7 %
LYMPHOCYTES NFR BLD AUTO: 18.3 %
MAGNESIUM SERPL-MCNC: 1.92 MG/DL (ref 1.6–2.4)
MAGNESIUM SERPL-MCNC: 2.05 MG/DL (ref 1.6–2.4)
MCH RBC QN AUTO: 30.9 PG (ref 26–34)
MCH RBC QN AUTO: 31.2 PG (ref 26–34)
MCHC RBC AUTO-ENTMCNC: 31.4 G/DL (ref 32–36)
MCHC RBC AUTO-ENTMCNC: 34.2 G/DL (ref 32–36)
MCV RBC AUTO: 91 FL (ref 80–100)
MCV RBC AUTO: 99 FL (ref 80–100)
MONOCYTES # BLD AUTO: 0.57 X10*3/UL (ref 0.05–0.8)
MONOCYTES # BLD AUTO: 0.66 X10*3/UL (ref 0.05–0.8)
MONOCYTES NFR BLD AUTO: 8.3 %
MONOCYTES NFR BLD AUTO: 9.2 %
NEUTROPHILS # BLD AUTO: 5.05 X10*3/UL (ref 1.6–5.5)
NEUTROPHILS # BLD AUTO: 5.2 X10*3/UL (ref 1.6–5.5)
NEUTROPHILS NFR BLD AUTO: 70.1 %
NEUTROPHILS NFR BLD AUTO: 75.7 %
NRBC BLD-RTO: 0 /100 WBCS (ref 0–0)
NRBC BLD-RTO: 0 /100 WBCS (ref 0–0)
PHOSPHATE SERPL-MCNC: 3.7 MG/DL (ref 2.5–4.9)
PHOSPHATE SERPL-MCNC: 4.5 MG/DL (ref 2.5–4.9)
PLATELET # BLD AUTO: 164 X10*3/UL (ref 150–450)
PLATELET # BLD AUTO: 174 X10*3/UL (ref 150–450)
POTASSIUM SERPL-SCNC: 4.2 MMOL/L (ref 3.5–5.3)
POTASSIUM SERPL-SCNC: 4.4 MMOL/L (ref 3.5–5.3)
RBC # BLD AUTO: 3.88 X10*6/UL (ref 4–5.2)
RBC # BLD AUTO: 3.98 X10*6/UL (ref 4–5.2)
SODIUM SERPL-SCNC: 134 MMOL/L (ref 136–145)
SODIUM SERPL-SCNC: 139 MMOL/L (ref 136–145)
WBC # BLD AUTO: 6.9 X10*3/UL (ref 4.4–11.3)
WBC # BLD AUTO: 7.2 X10*3/UL (ref 4.4–11.3)

## 2024-12-11 PROCEDURE — 36415 COLL VENOUS BLD VENIPUNCTURE: CPT

## 2024-12-11 PROCEDURE — 80069 RENAL FUNCTION PANEL: CPT

## 2024-12-11 PROCEDURE — 2500000001 HC RX 250 WO HCPCS SELF ADMINISTERED DRUGS (ALT 637 FOR MEDICARE OP)

## 2024-12-11 PROCEDURE — 83735 ASSAY OF MAGNESIUM: CPT

## 2024-12-11 PROCEDURE — 85025 COMPLETE CBC W/AUTO DIFF WBC: CPT

## 2024-12-11 PROCEDURE — 37799 UNLISTED PX VASCULAR SURGERY: CPT

## 2024-12-11 PROCEDURE — 2500000004 HC RX 250 GENERAL PHARMACY W/ HCPCS (ALT 636 FOR OP/ED)

## 2024-12-11 PROCEDURE — 97165 OT EVAL LOW COMPLEX 30 MIN: CPT | Mod: GO

## 2024-12-11 PROCEDURE — 99291 CRITICAL CARE FIRST HOUR: CPT

## 2024-12-11 PROCEDURE — 99232 SBSQ HOSP IP/OBS MODERATE 35: CPT | Performed by: PHYSICIAN ASSISTANT

## 2024-12-11 PROCEDURE — 2500000005 HC RX 250 GENERAL PHARMACY W/O HCPCS

## 2024-12-11 PROCEDURE — 1100000001 HC PRIVATE ROOM DAILY

## 2024-12-11 PROCEDURE — 97530 THERAPEUTIC ACTIVITIES: CPT | Mod: GO

## 2024-12-11 RX ORDER — CARVEDILOL 3.12 MG/1
6.25 TABLET ORAL 2 TIMES DAILY
Status: DISCONTINUED | OUTPATIENT
Start: 2024-12-11 | End: 2024-12-11

## 2024-12-11 RX ORDER — CARVEDILOL 3.12 MG/1
3.12 TABLET ORAL ONCE
Status: DISCONTINUED | OUTPATIENT
Start: 2024-12-11 | End: 2024-12-11

## 2024-12-11 RX ORDER — ACETAMINOPHEN 325 MG/1
975 TABLET ORAL 3 TIMES DAILY
Status: DISCONTINUED | OUTPATIENT
Start: 2024-12-11 | End: 2024-12-12 | Stop reason: HOSPADM

## 2024-12-11 RX ORDER — CARVEDILOL 3.12 MG/1
3.12 TABLET ORAL 2 TIMES DAILY
Status: DISCONTINUED | OUTPATIENT
Start: 2024-12-11 | End: 2024-12-12 | Stop reason: HOSPADM

## 2024-12-11 RX ORDER — ACETAMINOPHEN 325 MG/1
TABLET ORAL
Status: COMPLETED
Start: 2024-12-11 | End: 2024-12-11

## 2024-12-11 ASSESSMENT — COGNITIVE AND FUNCTIONAL STATUS - GENERAL
CLIMB 3 TO 5 STEPS WITH RAILING: A LITTLE
DRESSING REGULAR UPPER BODY CLOTHING: A LITTLE
PERSONAL GROOMING: A LITTLE
DRESSING REGULAR LOWER BODY CLOTHING: A LITTLE
TOILETING: A LITTLE
TOILETING: A LITTLE
MOBILITY SCORE: 18
MOVING TO AND FROM BED TO CHAIR: A LITTLE
DAILY ACTIVITIY SCORE: 18
DRESSING REGULAR LOWER BODY CLOTHING: A LITTLE
MOVING FROM LYING ON BACK TO SITTING ON SIDE OF FLAT BED WITH BEDRAILS: A LITTLE
HELP NEEDED FOR BATHING: A LITTLE
DRESSING REGULAR UPPER BODY CLOTHING: A LITTLE
DAILY ACTIVITIY SCORE: 20
WALKING IN HOSPITAL ROOM: A LITTLE
TURNING FROM BACK TO SIDE WHILE IN FLAT BAD: A LITTLE
HELP NEEDED FOR BATHING: A LITTLE
EATING MEALS: A LITTLE
STANDING UP FROM CHAIR USING ARMS: A LITTLE

## 2024-12-11 ASSESSMENT — PAIN DESCRIPTION - DESCRIPTORS
DESCRIPTORS: ACHING

## 2024-12-11 ASSESSMENT — PAIN - FUNCTIONAL ASSESSMENT
PAIN_FUNCTIONAL_ASSESSMENT: 0-10

## 2024-12-11 ASSESSMENT — ACTIVITIES OF DAILY LIVING (ADL)
BATHING_ASSISTANCE: MINIMAL
ADL_ASSISTANCE: INDEPENDENT

## 2024-12-11 ASSESSMENT — PAIN SCALES - GENERAL
PAINLEVEL_OUTOF10: 4
PAINLEVEL_OUTOF10: 0 - NO PAIN
PAINLEVEL_OUTOF10: 3
PAINLEVEL_OUTOF10: 4
PAINLEVEL_OUTOF10: 6
PAINLEVEL_OUTOF10: 3
PAINLEVEL_OUTOF10: 4
PAINLEVEL_OUTOF10: 0 - NO PAIN
PAINLEVEL_OUTOF10: 0 - NO PAIN
PAINLEVEL_OUTOF10: 3
PAINLEVEL_OUTOF10: 0 - NO PAIN

## 2024-12-11 ASSESSMENT — PAIN DESCRIPTION - LOCATION
LOCATION: BACK

## 2024-12-11 ASSESSMENT — PAIN SCALES - PAIN ASSESSMENT IN ADVANCED DEMENTIA (PAINAD): TOTALSCORE: MEDICATION (SEE MAR)

## 2024-12-11 ASSESSMENT — PAIN DESCRIPTION - ORIENTATION
ORIENTATION: LOWER;MID
ORIENTATION: LOWER;MID

## 2024-12-11 NOTE — PROGRESS NOTES
CARDIAC SURGERY CONSULT PROGRESS NOTE    SUBJECTIVE  Catarina Cutler is 89 y.o. female with history of HLD, CAD, TAAA s/p R carotid to R subclavian bypass and arch repair for ascending aneurysm and Kommerl's diverticulum repair in 2012 (Dr. Farris and Dr. Gerardo) who presented to [facility] yesterday with back pain that woke her up from sleep at 2am on 12/9. CT shows interval enlargement of aneurysm sac in ascending and descending aorta and possible endoleak.     Cardiac surgery consulted along with vascular surgery for surgical evaluation.     Cardiac/Pertinent Imaging  CTA C/A/P 12/9/24  IMPRESSION: VASCULAR:  1. Postsurgical changes of ascending thoracic aortic aneurysm and  Komerell's diverticulum repair with debranching of the 3 great  vessels and reimplantation along the anterior margin of the ascending  thoracic aorta. Additional postsurgical changes of right carotid  artery to right subclavian artery bypass synthetic graft. There is  suggestion with a dissection flap within this graft.  2. Significant interval enlargement of aneurysmal sac in the  ascending and descending thoracic aorta. There is contrast pooling  into the excluded aneurysmal sac, concerning for an endoleak within  limitations of a two phase study. In addition, the descending  thoracic aneurysmal sac is seen compressing the left atrium and left  ventricle. Vascular surgery consultation is recommended.  3. Interval enlargement in fusiform abdominal aortic aneurysm now  measuring up to 3.2 cm. Multifocal ulcerating atherosclerotic plaque  throughout the abdominal aorta.      CHEST ABDOMEN PELVIS:  1. New atrophic appearance of the right kidney in the setting of  severe segmental narrowing of the dominant right renal artery at its  origin is most compatible with ischemic injury.  2. New spiculated pulmonary nodule in the superior segment of left  lower lobe measuring 1.9 cm, concerning for malignancy. Consider  tissue sampling for further  "evaluation.  3. Additional incidental non-acute findings as detailed above.     TTE 12/10/24: CONCLUSIONS:   1. Left ventricular ejection fraction is normal, by visual estimate at 65-70%.   2. Spectral Doppler shows an abnormal pattern of left ventricular diastolic filling.   3. No left ventricular thrombus visualized.   4. There is normal right ventricular global systolic function.   5. Mild to moderate tricuspid regurgitation visualized.   6. Right ventricular systolic pressure is within normal limits.   7. Descending aorta is severely dilated ( at least 5 cm) and is compressing the LA ) The ascending aorta is s/p aneurysm and Konnerl's diverticulum repair. Not well seen, though size of aortic root appears to be normal as does size of proximal ascending aorta. Abominal aortic anatomy in the subcostal view is unclear. Recommend further assessment with CTA to better understand the abdominal aortic pathology.   8. Findings discussed with the primary service at the time of reporting.   9. No prior echocardiogram available for comparison.    Objective   /67   Pulse 61   Temp 36.5 °C (97.7 °F) (Temporal)   Resp 15   Ht 1.549 m (5' 1\")   Wt 46.1 kg (101 lb 10.1 oz)   SpO2 97%   BMI 19.20 kg/m²   0-10 (Numeric) Pain Score: 4   Vitals:    12/11/24 0437   Weight: 46.1 kg (101 lb 10.1 oz)          Intake/Output Summary (Last 24 hours) at 12/11/2024 1111  Last data filed at 12/10/2024 1600  Gross per 24 hour   Intake 250.75 ml   Output --   Net 250.75 ml       Physical Exam  Physical Exam  Constitutional:       General: She is not in acute distress.     Appearance: She is not toxic-appearing.      Comments: Elderly female laying in bed   HENT:      Head: Normocephalic.      Mouth/Throat:      Mouth: Mucous membranes are moist.   Cardiovascular:      Rate and Rhythm: Normal rate and regular rhythm.      Heart sounds: No murmur heard.  Pulmonary:      Effort: Pulmonary effort is normal.      Breath sounds: Normal " breath sounds.   Musculoskeletal:      Cervical back: Neck supple.      Right lower leg: No edema.      Left lower leg: No edema.   Skin:     General: Skin is warm and dry.   Neurological:      General: No focal deficit present.      Mental Status: She is alert and oriented to person, place, and time.   Psychiatric:         Mood and Affect: Mood normal.         Behavior: Behavior normal.         Medications  Scheduled medications  acetaminophen, 975 mg, oral, TID  aspirin, 81 mg, oral, Daily  atorvastatin, 20 mg, oral, Daily  carvedilol, 3.125 mg, oral, Once  carvedilol, 6.25 mg, oral, BID  lidocaine, 2 patch, transdermal, Daily  pantoprazole, 40 mg, oral, Daily before breakfast  polyethylene glycol, 17 g, oral, Daily  sennosides, 1 tablet, oral, Nightly    Continuous medications   PRN medications  PRN medications: oxyCODONE    Labs  Results for orders placed or performed during the hospital encounter of 12/10/24 (from the past 24 hours)   Lactate   Result Value Ref Range    Lactate 0.9 0.4 - 2.0 mmol/L   CBC and Auto Differential   Result Value Ref Range    WBC 6.9 4.4 - 11.3 x10*3/uL    nRBC 0.0 0.0 - 0.0 /100 WBCs    RBC 3.98 (L) 4.00 - 5.20 x10*6/uL    Hemoglobin 12.4 12.0 - 16.0 g/dL    Hematocrit 36.3 36.0 - 46.0 %    MCV 91 80 - 100 fL    MCH 31.2 26.0 - 34.0 pg    MCHC 34.2 32.0 - 36.0 g/dL    RDW 12.5 11.5 - 14.5 %    Platelets 164 150 - 450 x10*3/uL    Neutrophils % 75.7 40.0 - 80.0 %    Immature Granulocytes %, Automated 0.3 0.0 - 0.9 %    Lymphocytes % 13.7 13.0 - 44.0 %    Monocytes % 8.3 2.0 - 10.0 %    Eosinophils % 1.7 0.0 - 6.0 %    Basophils % 0.3 0.0 - 2.0 %    Neutrophils Absolute 5.20 1.60 - 5.50 x10*3/uL    Immature Granulocytes Absolute, Automated 0.02 0.00 - 0.50 x10*3/uL    Lymphocytes Absolute 0.94 0.80 - 3.00 x10*3/uL    Monocytes Absolute 0.57 0.05 - 0.80 x10*3/uL    Eosinophils Absolute 0.12 0.00 - 0.40 x10*3/uL    Basophils Absolute 0.02 0.00 - 0.10 x10*3/uL   Magnesium   Result Value  Ref Range    Magnesium 2.05 1.60 - 2.40 mg/dL   Renal Function Panel   Result Value Ref Range    Glucose 82 74 - 99 mg/dL    Sodium 139 136 - 145 mmol/L    Potassium 4.4 3.5 - 5.3 mmol/L    Chloride 103 98 - 107 mmol/L    Bicarbonate 27 21 - 32 mmol/L    Anion Gap 13 10 - 20 mmol/L    Urea Nitrogen 18 6 - 23 mg/dL    Creatinine 0.86 0.50 - 1.05 mg/dL    eGFR 65 >60 mL/min/1.73m*2    Calcium 8.4 (L) 8.6 - 10.6 mg/dL    Phosphorus 3.7 2.5 - 4.9 mg/dL    Albumin 3.3 (L) 3.4 - 5.0 g/dL               ASSESSMENT & PLAN  Catarina Cutler is 89 y.o. female with history of HLD, CAD, TAAA s/p R carotid to R subclavian bypass and arch repair for ascending aneurysm and Kommerl's diverticulum repair in 2012 (Dr. Farris and Dr. Gerardo) who presented to [facility] yesterday with back pain that woke her up from sleep at 2am on 12/9. CT shows interval enlargement of aneurysm sac in ascending and descending aorta and possible endoleak.     Cardiac surgery consulted along with vascular surgery for surgical evaluation.     RECOMMENDATIONS/PLAN  Dr. Joel aware of patient, currently reviewing case and available imaging.   Patient is adamant that she does not want any open surgical procedure, but she is open to hearing about endovascular options. Dr. Joel reviewing for endovascular options - final plan/recommendations pending.   - CTA 12/9/24 in EMR  - TTE done 12/10/24  - Continue impulse control.         Will continue to follow along.  Thank you for the consultation.   Please page the cardiac surgery consult pager [phone] with any questions or changes in patient condition.      Lyssa Doherty PA-C  Cardiac Surgery Consult JULIANA  [facility]  Cardiac Surgery Consult Pager [phone]     12/11/2024  11:11 AM

## 2024-12-11 NOTE — PROGRESS NOTES
ICU to [facility] Transfer Summary     I:  ICU Admission Reason & Brief ICU Course:    Ms. Cutler is a 88 yo F with a PMHx of HLD, CAD (hx of multivessel disease in 2012?), hx of ascending thoracic aortic aneurysm (4.5cm), descending thoracic aortic aneurysm (5.1 cm), TAAA s/p R carotid to R subclavian bypass and arch repair for ascending aneurysm and Kommerl's diverticulum repair in 2012 who initially presented to [facility] ED for acutely worsening back pain that was different from chronic back pain.  In the ED at [facility],    In the ED:   - Vitals:  T 97.8, HR 73, /73,  RR 16, SpO2 94 on RA  - Labs:  CBC: WBC  9.7, Hgb 13.6, plt 131  BMP: Na 139, K 4.0, Cl 103, HCO3 27, BUN 12, Cr 0.66, glu 111  LFT: Ca 8.3, tprot 6.5, alb 3.8, alkphos 108, AST 28, ALT 17, tbili 0.8  hs-TnI 37 ->33    CTA C/A/P was notable for dissection flap R carotid to R subclavian artery bypass graft, interval enlargement of ascending and descending thoracic aortic aneurysmal sac with concern for endoleak. The descending thoracic aneurysm found to be compressing the left artium and left ventricle. There was also interval enlargement of fusiform abdominal aortic aneurysm measuring 3.2 cm. Also found to have new interval enlargement of ascending and descending thoracic aorta aneurysmal sac with possible endoleak and a 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   In the ED, she was given Flexeril 5 mg once, Toradol 15 mg once, and morphine 4 mg once. She was given IV labetalol 10 mg and 20 mg pushes for impulse control and transferred to [facility] for further management.     On arrival to [facility], the patient was started on labetalol gtt for impulse control, after which the patient's pt's BP and HTN was controlled. The pt was then started on Coreg 3.125mg BID for impulse control and the labetalol gtt was Dc'd.     The patient was then transferred to the floor for further management of her condition.     C: Code Status/DPOA  Info/Goals of Care/ACP Note    DNR and No Intubation  DPOA/Contact Number: Carmina Grande [phone]  (mobile)    U: Unprescribing & Pertinent High-Risk Medications    Changes to home meds: N/A     Anticoagulation: Yes - SCDs    Antibiotics:   [x] N/A - no current planned antimicrobioals      P: Pending Tests at the Time of Transfer   N/A      A: Active consultants, including Rehab:   [x]  Subspecialty Consultants:  CTS, Vascular Surgery  [x]  PT  [x]  OT  []  SLP  []  Wound Care    U: Uncertainty Measure/Diagnostic Pause:    Working diagnosis at the time of transfer , though ddx includes      Diagnosis Degree of Certainty: 1. High degree of certainty about the clinical diagnosis.     S: Summary of Major Problems and To-Dos:   Catarina Cutler is a 89 y.o. female with a hx of HLD, CAD (hx of multivessel disease in 2012?), hx of thoracic ascending aortic aneurysm (4.5cm), descending thoracic aortic aneurysm (5.1 cm), TAAA s/p R carotid to R subclavian bypass and arch repair for ascending aneurysm and Kommerl's diverticulum repair in 2012 presenting to [facility] ED for back pain. 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 Admitted to the CICU for impulse control.   :: Found to have new interval enlargement of ascending and descending thoracic aorta aneurysmal sac with possible endoleak and a 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         Updates 12/11/24:   -On Coreg 3.125mg BID for HR and BP control. Can consider up-titrating to 6.25mg BID. Did not up-titrate today I/s/o soft BP's  -Gave 250cc IVF bolus as pt with soft BP's and low PO intake   -CTS consulted and following, pending further discussion of possible surgical options. Pt open to discussing endovascular repair           Neurologic  FABIOLA        Cardiovascular  #C/f Thoracic Endoleak   #C/f  Dissection of R Subclavian A Bypass Graft  #Thoracic Aortic Aneurysm   #AAA  :: Hx of thoracic ascending aortic aneurysm (4.5cm), descending thoracic aortic aneurysm (5.1 cm)  ::hx of R carotid to R subclavian bypass and arch repair for ascending aneurysm and Kommerl's diverticulum repair in 2012  :: Pt previously had declined further work up and evaluation of her known thoracic aortic aneurysms   :: CTA chest/abdomen/pelvis notable for dissection flap of R carotid to R subclavian artery bypass graft, interval enlargement of ascending and descending thoracic aortic aneurysmal sac with concern for endoleak. The descending thoracic aneurysm found to be compression the left artium and left ventricle.   ::Interval enlargement of fusiform abdominal aortic aneurysm measuring 3.2 cm.   :: s/p IV labetalol x2 in the OSH ED (got total 30mg IV)   -Lactate 2.4  -> 0.9  ->0.9  -Started Impulse control with labetalol gtt goal SBP <120, HR < 60   -CTS consult placed, appreciate recs   -Avoiding chemical AC I/s/o c/f possible endoleak and possible enlargement of aortic aneurysm         #CAD   :: No prior echo on file   :: Last TTE 12/10/24 EF 65-70%, with abnormal LV diastolic filling  Plan:   -C/w ASA 81 mg, atorvastatin 20 mg           Respiratory  #Pulmonary Nodules   :: CTA chest notable for new spiculated pulmonary nodule in the superior segment of left lower lobe measuring 1.9 cm, concerning for possible malignancy  :: CXR notable for 1.2 cm density in the left midlung and possible 1.3cm density in the left lung base   Plan:  -Consider pulm vs IR consult for biopsy during this admission or can consider possible outpatient follow-up         Gastrointestinal  FABIOLA        Renal  #Atrophic R kidney   :: CTA C/A/P notable for new atrophic appearance of the right kidney in the setting of severe segmental narrowing of the dominant right renal artery  Plan:   -Monitor renal function and UOP         Hematologic  FABIOLA        Infectious  Disease  FABIOLA          Endocrine  FABIOLA        MSK  #Back pain   #Mild Compression of T5   #OA of spine  :: Seen on CT of T-spine  :: Osteophytes also seen on CT scan of spine  Plan:  -Tylenol 975mg TID for pain  -Oxy 5mg PRN for pain   -Lidocaine patch for pain    -Consider adding robaxin or baclofen for additional pain relief as pt prefers not to take opioids          Fluids: PRN  Electrolytes: PRN  Nutrition: Sips w/ chips   Lines: PRN  DVT prophylaxis: SCDs  GI prophylaxis:   Gtt: labetalol     Fluids: PRN  Electrolytes: PRN  Nutrition: Sips w/ chips   Lines: PRN  DVT prophylaxis: SCDs  GI prophylaxis: PPI  Gtt: none    Janessa Lake MD  PGY-1 Internal Medicine      E: Exam, including Lines/Drains/Airways & Data Review:     Physical Exam  Vitals and nursing note reviewed.   Constitutional:       General: She is awake. She is not in acute distress.     Appearance: She is not ill-appearing or toxic-appearing.      Interventions: Nasal cannula in place.      Comments: On 1-2L O2 via NC    HENT:      Head: Normocephalic and atraumatic.   Cardiovascular:      Rate and Rhythm: Normal rate and regular rhythm.      Pulses:           Dorsalis pedis pulses are 2+ on the right side and 2+ on the left side.      Heart sounds: Normal heart sounds, S1 normal and S2 normal.   Pulmonary:      Effort: Pulmonary effort is normal. No accessory muscle usage, prolonged expiration or respiratory distress.      Breath sounds: Normal breath sounds and air entry. No wheezing, rhonchi or rales.   Abdominal:      General: Abdomen is flat. Bowel sounds are normal.      Palpations: Abdomen is soft.      Tenderness: There is no abdominal tenderness.   Musculoskeletal:      Right lower leg: No edema.      Left lower leg: No edema.   Skin:     General: Skin is warm and dry.   Neurological:      Mental Status: She is alert.   Psychiatric:         Behavior: Behavior is cooperative.        Lines/drains assessed for removal? Yes-A-line    Janessa  MD Aleksandra   PGY-1 Internal Medicine

## 2024-12-11 NOTE — SIGNIFICANT EVENT
Ms. Catarina Cutler was transferred from [facility] to [place] [address] this evening, blood pressures and heart rate well- controlled. Dr. Joel at the bedside discussing plan of care with patient. After discussion with Dr. Joel, decision made to not pursue any surgical or procedural intervention at this time due to risk. Patient with daughter at the bedside, inquiring about next steps regarding discharge. Patient currently wearing supplemental O2 with back pain that she states still occurs during activity. Informed patient we will work to wean O2 and determine good homegoing pain regimen. Patient currently denies pain at rest.    Patient safely transferred to the floor with all belongings in stable condition to the [facility] Cardiology service. ased on chart review and physical examination, patient is appropriate for the I service at this time. All questions answered at this time, plan of care as per CICU daily progress note.       To be seen and discussed with AM staff.

## 2024-12-11 NOTE — PROGRESS NOTES
"  VASCULAR SURGERY PROGRESS NOTE  Assessment/Plan   Catarina Cutler is 89 y.o. female with history HLD, CAD, TAAA s/p R carotid to R subclavian bypass and arch repair for ascending aneurysm and Kommerl's diverticulum repair in 2012 (Dr. Farris and Dr. Gerardo) who presented to [facility] yesterday with back pain that woke her up from sleep at 2am on 12/9. CT shows interval enlargement of aneurysm sac in ascending and descending aorta and possible endoleak. Her pain has improved with BP control. Currently on labetalol gtt.    Patient continues to express interest in possible endovascular options, patient not interested in open surgery, poor surgical candidate overall for major procedure.  Patient and family to be visited at bedside by provider, with potential interventions and future course to be discussed.    -Operative options to be discussed with patient at bedside    D/w attending, Dr. Dunphy William T Julian, MD  PGY-1 General Surgery  Vascular Surgery [id number]        Subjective   No acute events overnight. Patient seen at bedside, awake, alert, conversational, complaining of moderate back pain unrelieved by medication.     Objective   Vitals:  Heart Rate:  [54-68]   Temp:  [36 °C (96.8 °F)-36.5 °C (97.7 °F)]   Resp:  [11-25]   BP: ()/(57-69)   Height:  [154.9 cm (5' 1\")]   Weight:  [45.8 kg (100 lb 15.5 oz)-46.1 kg (101 lb 10.1 oz)]   SpO2:  [89 %-100 %]     Exam:  Constitutional: No acute distress, resting comfortably  Neuro:  AOx3, grossly intact  ENMT: moist mucous membranes  CV: no tachycardia  Pulm: non-labored on RA  GI: soft, non-tender, non-distended  Skin: warm and dry  Musculoskeletal: moving all extremities  Extremities: Multiphasic DP bilateral lower extremities, neurovascular intact bilateral lower extremities. Palpable radials.      Labs:  Results from last 7 days   Lab Units 12/11/24  0231 12/10/24  0018 12/09/24  1439   WBC AUTO x10*3/uL 6.9 10.2 9.7   HEMOGLOBIN g/dL 12.4 13.2 13.6 "   PLATELETS AUTO x10*3/uL 164 174 131*      Results from last 7 days   Lab Units 12/11/24  0231 12/10/24  0018 12/09/24  1439 12/09/24  1439   SODIUM mmol/L 139 137  --  139   POTASSIUM mmol/L 4.4 4.1  --  4.0   CHLORIDE mmol/L 103 101  --  103   CO2 mmol/L 27 26  --  27   BUN mg/dL 18 12  --  12   CREATININE mg/dL 0.86 0.79  --  0.66   GLUCOSE mg/dL 82 118*  --  111*   MAGNESIUM mg/dL 2.05 2.10   < >  --    PHOSPHORUS mg/dL 3.7  --   --   --     < > = values in this interval not displayed.      Results from last 7 days   Lab Units 12/10/24  0018   INR  1.0   PROTIME seconds 11.7   APTT seconds 23*

## 2024-12-11 NOTE — PROGRESS NOTES
Occupational Therapy    Evaluation and Treatment    Patient Name: Catarina Cutler  MRN: [id number]  Today's Date: 12/11/2024  Room: 17/17  Time Calculation  Start Time: 0915  Stop Time: 0952  Time Calculation (min): 37 min    Assessment  IP OT Assessment  OT Assessment: Pt is an 89 year old female who demonstrates decreased strength, activity tolerance, and balance, which impedes occupational performance.  Prognosis: Good  Barriers to Discharge Home: Cognition needs, Physical needs  Cognition Needs: Insight of patient limited regarding functional ability/needs, Cognition-related high falls risk  Physical Needs: Intermittent ADL assistance needed, High falls risk due to function or environment  Evaluation/Treatment Tolerance: Patient tolerated treatment well  Medical Staff Made Aware: Yes  End of Session Communication: Bedside nurse  End of Session Patient Position: Up in chair, Alarm on    Plan:  Inpatient Plan  Treatment Interventions: ADL retraining, Functional transfer training, UE strengthening/ROM, Endurance training, Cognitive reorientation, Patient/family training, Equipment evaluation/education, Neuromuscular reeducation, Compensatory technique education  OT Frequency: 3 times per week  OT Discharge Recommendations: Low intensity level of continued care  Equipment Recommended upon Discharge:  (TBD)  OT Recommended Transfer Status: Assist of 1  OT - OK to Discharge: Yes  OT Assessment  OT Assessment Results: Decreased ADL status, Decreased cognition, Decreased safe judgment during ADL, Decreased endurance, Decreased functional mobility, Decreased IADLs  Prognosis: Good  Barriers to Discharge: None  Evaluation/Treatment Tolerance: Patient tolerated treatment well  Medical Staff Made Aware: Yes    Subjective   Current Problem:  1. Aortic aneurysm, unspecified portion of aorta, unspecified whether ruptured (CMS-HCC)  Transthoracic Echo (TTE) Complete    Transthoracic Echo (TTE) Complete    Insert arterial  line    Arterial Line Insertion      2. Aneurysm of aorta in diseases classified elsewhere  Transthoracic Echo (TTE) Complete    Transthoracic Echo (TTE) Complete        General:  Reason for Referral: new interval enlargement of ascending and descending thoracic aorta aneurysmal sac with possible endoleak and a 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 Admitted to the CICU for impulse control.  Past Medical History Relevant to Rehab: HLD, CAD (hx of multivessel disease in 2012?), hx of ascending thoracic aortic aneurysm (4.5cm), descending thoracic aortic aneurysm (5.1 cm), TAAA s/p R carotid to R subclavian bypass and arch repair for ascending aneurysm and Kommerl's diverticulum repair in 2012  Prior to Session Communication: Bedside nurse  Patient Position Received: Bed, 3 rail up, Alarm off, not on at start of session  Family/Caregiver Present: Yes  Caregiver Feedback: Pt's daughter present and supportive  General Comment: Pt supine in bed on arrival, agreeable to participate and eager to get OOB. On 2 L NC.     Precautions:  Medical Precautions: Cardiac precautions, Fall precautions, Oxygen therapy device and L/min  Precautions Comment: Impulse control: SBP <120; per RN HR<70    Vital Signs:   12/11/24 0915 12/11/24 0952   Vital Signs   Vitals Session Pre OT Post OT   Heart Rate 66 70   Resp 17 22   SpO2 98 % 95 %   /55 125/65   MAP (mmHg) 76 86   Vital Signs Comment  --  DURING: HR increased to low 80s-high 70s with activity (RN aware). SBP decreased from 126 to 98 with STS, pt asymptomatic and BP recovered after ~1 minute static standing.       Pain:  Pain Assessment  Pain Assessment: 0-10  0-10 (Numeric) Pain Score: 4  Pain Type: Chronic pain  Pain Location: Back  Pain Orientation: Lower, Mid  Pain Interventions: Distraction, Emotional support, Repositioned (pain meds by RN)  Response to Interventions: No change in pain    Lines/Tubes/Drains:  Arterial Line 12/10/24 Left Radial  (Active)   Number of days: 1       External Urinary Catheter Female (Active)   Number of days: 1         Objective   Cognition:  Overall Cognitive Status: Within Functional Limits  Orientation Level: Oriented X4  Following Commands: Follows one step commands with repetition  Cognition Comments: Mild cueing for safety  Safety/Judgement: Exceptions to WFL  Unable to Self-Monitor and Self-Correct Consistently: Minimal  Insight: Mild  Impulsive: Mildly     Confusion Assessment Method (CAM)  Acute Onset and Fluctuating Course (1A): Yes  Acute Onset and Fluctuating Course (1B): Yes  Inattention (2): No  Disorganized Thinking (3): No  Rate Patient's Level of Consciousness (4): Alert (Normal), No  Delirium Present: No     Home Living:  Type of Home: Condo  Lives With: Alone  Home Adaptive Equipment: Walker rolling or standard (walking stick)  Home Layout: Able to live on main level with bedroom/bathroom, Two level  Home Access: Stairs to enter with rails  Entrance Stairs-Rails: Both  Entrance Stairs-Number of Steps: 1  Bathroom Shower/Tub: Walk-in shower  Bathroom Equipment: Grab bars in shower, Grab bars around toilet  Home Living Comments: daughter lives out of state but can stay with pt temporarily at time of discharge.     Prior Function:  Level of Ferdinand: Independent with ADLs and functional transfers  ADL Assistance: Independent  Homemaking Assistance: Independent (hired help for cleaning)  Ambulatory Assistance: Independent (occasional use of walking stick)  Vocational: Retired  Leisure: likes to read  Hand Dominance: Right  Prior Function Comments: +drives, -falls     ADL:  Eating Assistance: Independent  Grooming Assistance:  (mod I in sitting; supervision for oral care in standing at sink)  Bathing Assistance: Minimal  UE Dressing Assistance: Minimal  UE Dressing Deficit: Pull around back (seated EOB)  LE Dressing Assistance: Minimal  Toileting Assistance with Device: Minimal    Activity  Tolerance:  Endurance: Tolerates 10 - 20 min exercise with multiple rests  Early Mobility/Exercise Safety Screen: Proceed with mobilization - No exclusion criteria met    Balance:  Dynamic Sitting Balance  Dynamic Sitting-Level of Assistance: Close supervision  Static Sitting Balance  Static Sitting-Level of Assistance: Close supervision    Bed Mobility/Transfers: Bed Mobility  Bed Mobility: Yes  Bed Mobility 1  Bed Mobility 1: Supine to sitting  Level of Assistance 1: Contact guard  Bed Mobility Comments 1: HOB elevated, increased time. mild cueing to not stand immediately   and Transfers  Transfer: Yes  Transfer 1  Transfer From 1: Sit to  Transfer to 1: Stand  Transfer Level of Assistance 1: Contact guard  Trials/Comments 1: HHA    Sensation:  Light Touch: No apparent deficits    Strength:  Strength Comments: BUE >/=3/5    Coordination:  Movements are Fluid and Coordinated: Yes     Hand Function:  Hand Function  Gross Grasp: Functional  Coordination: Functional    Extremities:   RUE   RUE : Within Functional Limits, LUE   LUE: Within Functional Limits,       Treatment Completed on Evaluation    Therapy/Activity:     Therapeutic Activity  Therapeutic Activity Performed: Yes  Therapeutic Activity 1: Pt engaged in functional mobility from bed to sink and from sink to chair. Mostly CGA with R HHA however required min A for turns and for safe object negotiation. Throughout session, pt also required cues for pacing and safety awareness.    Outcome Measures: [facility] Daily Activity  Putting on and taking off regular lower body clothing: A little  Bathing (including washing, rinsing, drying): A little  Putting on and taking off regular upper body clothing: A little  Toileting, which includes using toilet, bedpan or urinal: A little  Taking care of personal grooming such as brushing teeth: None  Eating Meals: None  Daily Activity - Total Score: 20        ICU Mobility Screen  Early Mobility/Exercise Safety Screen: Proceed  with mobilization - No exclusion criteria met  ICU Mobility Scale: Walking with assistance of 1 person,          Education Documentation  Body Mechanics, taught by Janessa Gamino OT at 12/11/2024 12:52 PM.  Learner: Family, Patient  Readiness: Acceptance  Method: Explanation, Demonstration  Response: Verbalizes Understanding    Precautions, taught by Janessa Gamino OT at 12/11/2024 12:52 PM.  Learner: Family, Patient  Readiness: Acceptance  Method: Explanation, Demonstration  Response: Verbalizes Understanding    ADL Training, taught by Janessa Gamino OT at 12/11/2024 12:52 PM.  Learner: Family, Patient  Readiness: Acceptance  Method: Explanation, Demonstration  Response: Verbalizes Understanding    Education Comments  No comments found.        Goals:   Encounter Problems       Encounter Problems (Active)       ADLs       Patient will perform UB and LB bathing with modified independent level of assistance.       Start:  12/11/24    Expected End:  12/25/24            Patient with complete upper body dressing with modified independent level of assistance donning and doffing all UE clothes       Start:  12/11/24    Expected End:  12/25/24            Patient with complete lower body dressing with modified independent level of assistance donning and doffing all LE clothes  with PRN adaptive equipment        Start:  12/11/24    Expected End:  12/25/24            Patient will complete toileting including hygiene clothing management/hygiene with modified independent level of assistance.       Start:  12/11/24    Expected End:  12/25/24            Pt will complete simulated homemaking tasks, including laundry, cleaning, medication management, and simple meal prep, including item retrieval/transport tasks with mod I without cueing to allow for safe return home to prior living environment        Start:  12/11/24    Expected End:  12/25/24               BALANCE       Pt will increase dynamic  standing tolerance to >8 min with MI using LRAD during functional mobility/ADLs without LOB in order to improve activity tolerance and balance for self-care tasks.        Start:  12/11/24    Expected End:  12/25/24               COGNITION/SAFETY       Patient will participate in cognitive activities to demonstrate WFL score on further cognitive assessments, including Medi-Cog, MoCA and remain A&O x3, CAM (-).        Start:  12/11/24    Expected End:  12/25/24               MOBILITY       Patient will perform Functional mobility max Household distances/Community Distances with modified independent level of assistance and least restrictive device in order to improve safety and functional mobility.       Start:  12/11/24    Expected End:  12/25/24 12/11/24 at 12:53 PM   Janessa Gamino, OT   Rehab Office: [phone]

## 2024-12-11 NOTE — PROGRESS NOTES
Spiritual Care Visit  Spiritual Care Request    Reason for Visit:  Routine Visit: Introduction     Request Received From:       Focus of Care:  Visited With: Patient and family together         Refer to :          Spiritual Care Assessment    Spiritual Assessment:      Care Provided:       Sense of Community and or Orthodox Affiliation:  [place]         Addressed Needs/Concerns and/or Peggy Through:  Orthodox Encounters  Orthodox Needs: Prayer  Sacramental Encounters  Sacrament of Sick-Anointing: Patient declined anointing  Other Sacrament: P&B    Outcome:        Advance Directives:         Spiritual Care Annotation    Annotation:      Patient received a prayer and blessing (P&B) by Fr. Darnell Henderson,  [place] .  Daughter was present.

## 2024-12-11 NOTE — HOSPITAL COURSE
Ms. Cutler is a 88 yo F with a PMHx of HLD, CAD (hx of multivessel disease in 2012?), hx of ascending thoracic aortic aneurysm (4.5cm), descending thoracic aortic aneurysm (5.1 cm), TAAA s/p R carotid to R subclavian bypass and arch repair for ascending aneurysm and Kommerl's diverticulum repair in 2012 who initially presented to [facility] ED for acutely worsening back pain that was different from chronic back pain.  In the ED at [facility],    In the ED:   - Vitals:T 97.8, HR 73, /73,  RR 16, SpO2 94 on RA  - Labs:  CBC: WBC  9.7, Hgb 13.6, plt 131  BMP: Na 139, K 4.0, Cl 103, HCO3 27, BUN 12, Cr 0.66, glu 111  LFT: Ca 8.3, tprot 6.5, alb 3.8, alkphos 108, AST 28, ALT 17, tbili 0.8  hs-TnI 37 ->33    CTA C/A/P was notable for dissection flap R carotid to R subclavian artery bypass graft, interval enlargement of ascending and descending thoracic aortic aneurysmal sac with concern for endoleak. The descending thoracic aneurysm found to be compressing the left artium and left ventricle. There was also interval enlargement of fusiform abdominal aortic aneurysm measuring 3.2 cm. Also found to have new interval enlargement of ascending and descending thoracic aorta aneurysmal sac with possible endoleak and a 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   In the ED, she was given Flexeril 5 mg once, Toradol 15 mg once, and morphine 4 mg once. She was given IV labetalol 10 mg and 20 mg pushes for impulse control and transferred to [facility] for further management.     On arrival to [facility], the patient was started on labetalol gtt for impulse control, after which the patient was started on Coreg 3.125mg BID.    Vascular Surgery and CTS were consulted for evaluation for possible endovascular repair.  Patient adamant that she did not want to pursue any open surgical procedure. Overall deemed to be a poor candidate for any major procedure given comorbidities and advanced age. Therefore after discussion with  family, no surgical intervention was to be pursued. Medical therapy with impulse control and pain control recommended.    CTA chest notable for new spiculated pulmonary nodule in the superior segment of left lower lobe measuring 1.9 cm, concerning for possible malignancy. CXR notable for 1.2 cm density in the left midlung and possible 1.3cm density in the left lung base. Further outpatient evaluation recommended.    Patient's pain (chronic thoracic compression fracture back pain vs aneurysmal endoleak) was overall well controlled with high dose tylenol, lidocaine patches, and as needed oxycodone. No muscle relaxer initiated.    On day of discharge, she is feeling well with controlled pain, blood pressure, and heart rates. Daughter at bed side who plans to aid in patients care going home.    Outpatient follow up with her primary care physician was requested. Patient and daughters aware to contact Dr. Alex to arrange follow up if not set up by next week.    Discharge weight: 44.3 kg  After all labs and VS were reviewed the decision was made that the patient was medically stable for discharge.  The patient was discharged in satisfactory condition.  More than 60 minutes were spent in coordinating patient discharge.    ________________________________________    Telemetry 12/12/2024 (personally reviewed): SB 50-60s, sometimes high 40s. Occasional PACs     Physical exam:  General: NAD, thin kyphotic female  Head/ neck: no JVD  Cardiac: RRR, regular S1 S2 , no murmur, no rub, no gallop  Pulm: CTA bilaterally, no wheezes, rales or rhonchi.    Vascular: Radial 2+ bilaterally  GI: Non distended  Extremities: no LE edema   Neuro: no focal neuro deficits   Psych: appropriate mood and behavior   Skin: warm and dry

## 2024-12-11 NOTE — CARE PLAN
Problem: Skin  Goal: Decreased wound size/increased tissue granulation at next dressing change  Flowsheets (Taken 12/11/2024 1309)  Decreased wound size/increased tissue granulation at next dressing change: Protective dressings over bony prominences  Goal: Participates in plan/prevention/treatment measures  Outcome: Progressing  Goal: Prevent/manage excess moisture  Flowsheets (Taken 12/11/2024 1309)  Prevent/manage excess moisture: Moisturize dry skin  Goal: Prevent/minimize sheer/friction injuries  Outcome: Progressing  Flowsheets (Taken 12/11/2024 1309)  Prevent/minimize sheer/friction injuries: Use pull sheet  Goal: Promote/optimize nutrition  Outcome: Progressing  Flowsheets (Taken 12/11/2024 1309)  Promote/optimize nutrition: Assist with feeding  Goal: Promote skin healing  Flowsheets (Taken 12/11/2024 1309)  Promote skin healing:   Protective dressings over bony prominences   Turn/reposition every 2 hours/use positioning/transfer devices     Problem: Pain - Adult  Goal: Verbalizes/displays adequate comfort level or baseline comfort level  Outcome: Progressing  Flowsheets (Taken 12/11/2024 1309)  Verbalizes/displays adequate comfort level or baseline comfort level: Assess pain using appropriate pain scale     Problem: Safety - Adult  Goal: Free from fall injury  Outcome: Progressing  Flowsheets (Taken 12/11/2024 1309)  Free from fall injury: Based on caregiver fall risk screen, instruct family/caregiver to ask for assistance with transferring infant if caregiver noted to have fall risk factors     Problem: Chronic Conditions and Co-morbidities  Goal: Patient's chronic conditions and co-morbidity symptoms are monitored and maintained or improved  Outcome: Progressing  Flowsheets (Taken 12/11/2024 1309)  Care Plan - Patient's Chronic Conditions and Co-Morbidity Symptoms are Monitored and Maintained or Improved: Monitor and assess patient's chronic conditions and comorbid symptoms for stability, deterioration,  or improvement

## 2024-12-12 ENCOUNTER — PHARMACY VISIT (OUTPATIENT)
Dept: PHARMACY | Facility: CLINIC | Age: 89
End: 2024-12-12
Payer: COMMERCIAL

## 2024-12-12 VITALS
TEMPERATURE: 98.1 F | DIASTOLIC BLOOD PRESSURE: 75 MMHG | OXYGEN SATURATION: 92 % | SYSTOLIC BLOOD PRESSURE: 131 MMHG | HEIGHT: 61 IN | BODY MASS INDEX: 18.44 KG/M2 | WEIGHT: 97.66 LBS | RESPIRATION RATE: 18 BRPM | HEART RATE: 63 BPM

## 2024-12-12 PROBLEM — R91.1 PULMONARY NODULE: Status: ACTIVE | Noted: 2024-12-12

## 2024-12-12 PROBLEM — S22.050A COMPRESSION FRACTURE OF T5 VERTEBRA (MULTI): Status: ACTIVE | Noted: 2024-12-12

## 2024-12-12 PROCEDURE — 2500000001 HC RX 250 WO HCPCS SELF ADMINISTERED DRUGS (ALT 637 FOR MEDICARE OP)

## 2024-12-12 PROCEDURE — 99239 HOSP IP/OBS DSCHRG MGMT >30: CPT | Performed by: INTERNAL MEDICINE

## 2024-12-12 PROCEDURE — 2500000005 HC RX 250 GENERAL PHARMACY W/O HCPCS

## 2024-12-12 PROCEDURE — RXMED WILLOW AMBULATORY MEDICATION CHARGE

## 2024-12-12 PROCEDURE — 99232 SBSQ HOSP IP/OBS MODERATE 35: CPT | Performed by: PHYSICIAN ASSISTANT

## 2024-12-12 PROCEDURE — 2500000004 HC RX 250 GENERAL PHARMACY W/ HCPCS (ALT 636 FOR OP/ED): Performed by: PHYSICIAN ASSISTANT

## 2024-12-12 RX ORDER — LIDOCAINE 560 MG/1
2 PATCH PERCUTANEOUS; TOPICAL; TRANSDERMAL DAILY
Qty: 120 PATCH | Refills: 0 | Status: SHIPPED | OUTPATIENT
Start: 2024-12-13 | End: 2025-01-12

## 2024-12-12 RX ORDER — POLYETHYLENE GLYCOL 3350 17 G/17G
17 POWDER, FOR SOLUTION ORAL 2 TIMES DAILY PRN
Qty: 238 G | Refills: 0 | Status: SHIPPED | OUTPATIENT
Start: 2024-12-12

## 2024-12-12 RX ORDER — ASPIRIN 81 MG/1
81 TABLET ORAL DAILY
Qty: 30 TABLET | Refills: 0 | Status: SHIPPED | OUTPATIENT
Start: 2024-12-12 | End: 2025-01-11

## 2024-12-12 RX ORDER — SENNOSIDES 8.6 MG/1
1 TABLET ORAL NIGHTLY PRN
Qty: 15 TABLET | Refills: 0 | Status: SHIPPED | OUTPATIENT
Start: 2024-12-12

## 2024-12-12 RX ORDER — OXYCODONE HYDROCHLORIDE 5 MG/1
5 TABLET ORAL EVERY 6 HOURS PRN
Qty: 60 TABLET | Refills: 0 | Status: SHIPPED | OUTPATIENT
Start: 2024-12-12

## 2024-12-12 RX ORDER — ATORVASTATIN CALCIUM 20 MG/1
20 TABLET, FILM COATED ORAL NIGHTLY
Qty: 30 TABLET | Refills: 0 | Status: SHIPPED | OUTPATIENT
Start: 2024-12-12 | End: 2025-01-11

## 2024-12-12 RX ORDER — ACETAMINOPHEN 325 MG/1
975 TABLET ORAL 3 TIMES DAILY
Qty: 270 TABLET | Refills: 0 | Status: SHIPPED | OUTPATIENT
Start: 2024-12-12 | End: 2025-01-11

## 2024-12-12 RX ORDER — CARVEDILOL 3.12 MG/1
3.12 TABLET ORAL 2 TIMES DAILY
Qty: 60 TABLET | Refills: 0 | Status: SHIPPED | OUTPATIENT
Start: 2024-12-12 | End: 2025-01-11

## 2024-12-12 RX ORDER — PANTOPRAZOLE SODIUM 40 MG/1
40 TABLET, DELAYED RELEASE ORAL
Qty: 30 TABLET | Refills: 0 | Status: SHIPPED | OUTPATIENT
Start: 2024-12-13 | End: 2025-01-12

## 2024-12-12 RX ORDER — MAGNESIUM SULFATE 1 G/100ML
1 INJECTION INTRAVENOUS ONCE
Status: COMPLETED | OUTPATIENT
Start: 2024-12-12 | End: 2024-12-12

## 2024-12-12 ASSESSMENT — ACTIVITIES OF DAILY LIVING (ADL): LACK_OF_TRANSPORTATION: NO

## 2024-12-12 NOTE — CARE PLAN
Problem: Skin  Goal: Decreased wound size/increased tissue granulation at next dressing change  Outcome: Progressing  Goal: Participates in plan/prevention/treatment measures  Outcome: Progressing  Goal: Prevent/manage excess moisture  Outcome: Progressing  Goal: Prevent/minimize sheer/friction injuries  Outcome: Progressing  Goal: Promote/optimize nutrition  Outcome: Progressing  Goal: Promote skin healing  Outcome: Progressing     Problem: Pain - Adult  Goal: Verbalizes/displays adequate comfort level or baseline comfort level  Outcome: Progressing   The patient's goals for the shift include      The clinical goals for the shift include pt will remain HDS and safe throughout this shift    Over the shift, the patient did make progress toward the following goals. Pt medicated with tylenol 975 mg po for back pain. Up to bathroom with assist x 1. Will continue to monitor and call light within reach.

## 2024-12-12 NOTE — SIGNIFICANT EVENT
Rapid Response Nurse Note: RADAR alert: 6    Pager time: [phone]  Arrival time: 1240  Event end time: 1300  Location: T5  [] Triage by phone or secure messaging    Rapid response initiated by:  [] Rapid response RN [] Family [] Nursing Supervisor [] Physician   [x] RADAR auto page [] Sepsis auto-page [] RN [] RT   [] NP/PA [] Other:     Primary reason for call:   [] BAT [] New CPAP/BiPAP [] Bleeding [] Change in mental status   [] Chest pain [] Code blue [] FiO2 >/= 50% [] HR </= 40 bpm   [] HR >/= 130 bpm [] Hyperglycemia [] Hypoglycemia [x] RADAR    [] RR </= 8 bpm [] RR >/= 30 bpm [] SBP </= 90 mmHg [] SpO2 < 90%   [] Seizure [] Sepsis [] Shortness of breath  [] Staff concern: see comments     Initial VS and/or RADAR VS: T 36.3 °C; HR 59; RR 20; BP 94/58; SPO2 93%.    Providers present at bedside (if applicable):     Name of ICU Provider contacted (if applicable):     Interventions:  [x] None [] ABG/VBG [] Assist w/ICU transfer [] BAT paged    [] Bag mask [] Blood [] Cardioversion [] Code Blue   [] Code blue for intubation [] Code status changed [] Chest x-ray [] EKG   [] IV fluid/bolus [] KUB x-ray [] Labs/cultures [] Medication   [] Nebulizer treatment [] NIPPV (CPAP/BiPAP) [] Oxygen [] Oral airway   [] Peripheral IV [] Palliative care consult [] CT/MRI [] Sepsis protocol    [] Suctioned [] Other:     Outcome:  [] Coded and  [] Code blue for intubation [] Coded and transferred to ICU []  on division   [x] Remained on division (no change) [] Remained on division + additional monitoring [] Remained in ED [] Transferred to ED   [] Transferred to ICU [] Transferred to inpatient status [] Transferred for interventions (procedure) [] Transferred to ICU stepdown    [] Transferred to surgery [] Transferred to telemetry [] Sepsis protocol [] STEMI protocol   [] Stroke protocol [x] Bedside nurse instructed to page rapid response for any concerns or acute change in condition/VS     Additional Comments: RADAR  alert-score of 6. Upon arrival pt resting in bed-no complaints. Spoke with the bedside nurse and stated pt transitioned to oral meds from IV and monitoring tolerance. Recheck of BP and 113/65. HR mid 50's to mid 60's noted. No addtl concerns at this time.

## 2024-12-12 NOTE — PROGRESS NOTES
12/12/24 1341   Discharge Planning   Living Arrangements Alone   Support Systems Family members   Assistance Needed none   Type of Residence Private residence   Number of Stairs to Enter Residence 0   Number of Stairs Within Residence 0   Do you have animals or pets at home? No   Who is requesting discharge planning? Provider   Home or Post Acute Services None   Expected Discharge Disposition Home   Financial Resource Strain   How hard is it for you to pay for the very basics like food, housing, medical care, and heating? Not very   Housing Stability   In the last 12 months, was there a time when you were not able to pay the mortgage or rent on time? N   At any time in the past 12 months, were you homeless or living in a shelter (including now)? N   Transportation Needs   In the past 12 months, has lack of transportation kept you from medical appointments or from getting medications? no   In the past 12 months, has lack of transportation kept you from meetings, work, or from getting things needed for daily living? No   Patient Choice   Provider Choice list and CMS website (https://medicare.gov/care-compare#search) for post-acute Quality and Resource Measure Data were provided and reviewed with: Patient   Patient / Family choosing to utilize agency / facility established prior to hospitalization No   Stroke Family Assessment   Stroke Family Assessment Needed No     Transitional Care Coordination Progress Note:  Patient discussed during interdisciplinary rounds.   Team members present: RN TCC JENNIFER RAZO  Plan per Medical/Surgical team: Monitoring pain and BP  Payor: MEDICARE   Discharge disposition: Home   Potential Barriers: None   ADOD: 12-      Previous Home Care: None   DME: [facility]   Pharmacy: [facility]   Falls: None   PCP:  VIRIDIANA CARRASQUILLO   Dialysis: None

## 2024-12-12 NOTE — DISCHARGE SUMMARY
Discharge Diagnosis  Active Problems:    Thoracoabdominal aneurysm (CMS-HCC)    Aneurysm of ascending aorta (CMS-HCC)    Pulmonary nodule    Compression fracture of T5 vertebra (Multi)    CAD (coronary artery disease)        Issues Requiring Follow-Up  Active Problems:    Thoracoabdominal aneurysm (CMS-HCC)    Aneurysm of ascending aorta (CMS-HCC)    Pulmonary nodule    Compression fracture of T5 vertebra (Multi)    CAD (coronary artery disease)        Test Results Pending At Discharge  Pending Labs       No current pending labs.            Hospital Course  Ms. Cutler is a 89 yo F with a PMHx of HLD, CAD (hx of multivessel disease in 2012?), hx of ascending thoracic aortic aneurysm (4.5cm), descending thoracic aortic aneurysm (5.1 cm), TAAA s/p R carotid to R subclavian bypass and arch repair for ascending aneurysm and Kommerl's diverticulum repair in 2012 who initially presented to [facility] ED for acutely worsening back pain that was different from chronic back pain.  In the ED at [facility],    In the ED:   - Vitals:T 97.8, HR 73, /73,  RR 16, SpO2 94 on RA  - Labs:  CBC: WBC  9.7, Hgb 13.6, plt 131  BMP: Na 139, K 4.0, Cl 103, HCO3 27, BUN 12, Cr 0.66, glu 111  LFT: Ca 8.3, tprot 6.5, alb 3.8, alkphos 108, AST 28, ALT 17, tbili 0.8  hs-TnI 37 ->33    CTA C/A/P was notable for dissection flap R carotid to R subclavian artery bypass graft, interval enlargement of ascending and descending thoracic aortic aneurysmal sac with concern for endoleak. The descending thoracic aneurysm found to be compressing the left artium and left ventricle. There was also interval enlargement of fusiform abdominal aortic aneurysm measuring 3.2 cm. Also found to have new interval enlargement of ascending and descending thoracic aorta aneurysmal sac with possible endoleak and a 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   In the ED, she was given Flexeril 5 mg once, Toradol 15 mg once, and morphine 4 mg once. She  was given IV labetalol 10 mg and 20 mg pushes for impulse control and transferred to [facility] for further management.     On arrival to [facility], the patient was started on labetalol gtt for impulse control, after which the patient was started on Coreg 3.125mg BID.    Vascular Surgery and CTS were consulted for evaluation for possible endovascular repair.  Patient adamant that she did not want to pursue any open surgical procedure. Overall deemed to be a poor candidate for any major procedure given comorbidities and advanced age. Therefore after discussion with family, no surgical intervention was to be pursued. Medical therapy with impulse control and pain control recommended.    CTA chest notable for new spiculated pulmonary nodule in the superior segment of left lower lobe measuring 1.9 cm, concerning for possible malignancy. CXR notable for 1.2 cm density in the left midlung and possible 1.3cm density in the left lung base. Further outpatient evaluation recommended.    Patient's pain (chronic thoracic compression fracture back pain vs aneurysmal endoleak) was overall well controlled with high dose tylenol, lidocaine patches, and as needed oxycodone. No muscle relaxer initiated.    On day of discharge, she is feeling well with controlled pain, blood pressure, and heart rates. Daughter at bed side who plans to aid in patients care going home.    Outpatient follow up with her primary care physician was requested. Patient and daughters aware to contact Dr. Alex to arrange follow up if not set up by next week.    Discharge weight: 44.3 kg  After all labs and VS were reviewed the decision was made that the patient was medically stable for discharge.  The patient was discharged in satisfactory condition.  More than 60 minutes were spent in coordinating patient discharge.    ________________________________________    Telemetry 12/12/2024 (personally reviewed): SB 50-60s, sometimes high 40s. Occasional PACs     Physical  exam:  General: NAD, thin kyphotic female  Head/ neck: no JVD  Cardiac: RRR, regular S1 S2 , no murmur, no rub, no gallop  Pulm: CTA bilaterally, no wheezes, rales or rhonchi.    Vascular: Radial 2+ bilaterally  GI: Non distended  Extremities: no LE edema   Neuro: no focal neuro deficits   Psych: appropriate mood and behavior   Skin: warm and dry            Home Medications     Medication List      START taking these medications     polyethylene glycol 17 gram/dose powder; Commonly known as: Glycolax,   Miralax; Mix 17 g of powder and drink 2 times a day as needed   (constipation).     CONTINUE taking these medications     acetaminophen 325 mg tablet; Commonly known as: Tylenol; Take 3 tablets   (975 mg) by mouth 3 times a day.   aspirin 81 mg EC tablet; Take 1 tablet (81 mg) by mouth once daily.   atorvastatin 20 mg tablet; Commonly known as: Lipitor; Take 1 tablet (20   mg) by mouth once daily at bedtime.   carvedilol 3.125 mg tablet; Commonly known as: Coreg; Take 1 tablet   (3.125 mg) by mouth 2 times a day.   lidocaine 4 % patch; Place 2 patches over 12 hours on the skin once   daily. Remove & discard patch within 12 hours or as directed by MD.; Start   taking on: December 13, 2024   oxyCODONE 5 mg immediate release tablet; Commonly known as: Roxicodone;   Take 1 tablet (5 mg) by mouth every 6 hours if needed for moderate pain (4   - 6).   pantoprazole 40 mg EC tablet; Commonly known as: ProtoNix; Take 1 tablet   (40 mg) by mouth once daily in the morning. Take before meals. Do not   crush, chew, or split.; Start taking on: December 13, 2024   sennosides 8.6 mg tablet; Commonly known as: Senokot; Take 1 tablet (8.6   mg) by mouth as needed at bedtime for constipation.     ASK your doctor about these medications     Prolia 60 mg/mL syringe; Generic drug: denosumab; Inject 1 mL (60 mg   total) under the skin every 6 months.       Outpatient Follow-Up  Future Appointments   Date Time Provider Department Center    7/18/2025  9:30 AM Edilia Alex MD [id number]       Tia Horn PA-C    Delayed entry for 12/12 briefly the patient has history of extensive thoracic aneurysm repair in the past has declined surveillance imaging for years was admitted with back pain which was different and more severe than prior chronic back pain CT imaging showed enlargement of her AAA as well as concern of endoleak of the TAA she was seen by surgery not felt to be a good candidate nor is she interested in any sort of procedure at this point prefers to be symptomatically managed, we did achieve improved blood pressure and heart rate control she was discharged on oral narcotics for pain medication of note she also has a new spiculated lung lesion that she did not wish to be worked up either now or in the future, she is DNR and generally prefers to follow-up palliative course if she has ongoing or worsening symptoms would probably be reasonable for referral to hospice given that there is no plan for procedures or surgery she does not need to follow-up with vascular surgery

## 2024-12-12 NOTE — PROGRESS NOTES
CARDIAC SURGERY CONSULT PROGRESS NOTE    SUBJECTIVE  Catarina Cutler is 89 y.o. female with history of HLD, CAD, TAAA s/p R carotid to R subclavian bypass and arch repair for ascending aneurysm and Kommerl's diverticulum repair in 2012 (Dr. Farris and Dr. Gerardo) who presented to [facility] yesterday with back pain that woke her up from sleep at 2am on 12/9. CT shows interval enlargement of aneurysm sac in ascending and descending aorta and possible endoleak.     Cardiac surgery consulted along with vascular surgery for surgical evaluation.     Cardiac/Pertinent Imaging  CTA C/A/P 12/9/24  IMPRESSION: VASCULAR:  1. Postsurgical changes of ascending thoracic aortic aneurysm and  Komerell's diverticulum repair with debranching of the 3 great  vessels and reimplantation along the anterior margin of the ascending  thoracic aorta. Additional postsurgical changes of right carotid  artery to right subclavian artery bypass synthetic graft. There is  suggestion with a dissection flap within this graft.  2. Significant interval enlargement of aneurysmal sac in the  ascending and descending thoracic aorta. There is contrast pooling  into the excluded aneurysmal sac, concerning for an endoleak within  limitations of a two phase study. In addition, the descending  thoracic aneurysmal sac is seen compressing the left atrium and left  ventricle. Vascular surgery consultation is recommended.  3. Interval enlargement in fusiform abdominal aortic aneurysm now  measuring up to 3.2 cm. Multifocal ulcerating atherosclerotic plaque  throughout the abdominal aorta.      CHEST ABDOMEN PELVIS:  1. New atrophic appearance of the right kidney in the setting of  severe segmental narrowing of the dominant right renal artery at its  origin is most compatible with ischemic injury.  2. New spiculated pulmonary nodule in the superior segment of left  lower lobe measuring 1.9 cm, concerning for malignancy. Consider  tissue sampling for further  "evaluation.  3. Additional incidental non-acute findings as detailed above.     TTE 12/10/24: CONCLUSIONS:   1. Left ventricular ejection fraction is normal, by visual estimate at 65-70%.   2. Spectral Doppler shows an abnormal pattern of left ventricular diastolic filling.   3. No left ventricular thrombus visualized.   4. There is normal right ventricular global systolic function.   5. Mild to moderate tricuspid regurgitation visualized.   6. Right ventricular systolic pressure is within normal limits.   7. Descending aorta is severely dilated ( at least 5 cm) and is compressing the LA ) The ascending aorta is s/p aneurysm and Konnerl's diverticulum repair. Not well seen, though size of aortic root appears to be normal as does size of proximal ascending aorta. Abominal aortic anatomy in the subcostal view is unclear. Recommend further assessment with CTA to better understand the abdominal aortic pathology.   8. Findings discussed with the primary service at the time of reporting.   9. No prior echocardiogram available for comparison.    Objective   /69 (BP Location: Right arm, Patient Position: Lying)   Pulse 57   Temp 36.6 °C (97.9 °F) (Temporal)   Resp 20   Ht 1.549 m (5' 1\")   Wt (!) 44.3 kg (97 lb 10.6 oz)   SpO2 96%   BMI 18.45 kg/m²   0-10 (Numeric) Pain Score: 3   Vitals:    12/12/24 0357   Weight: (!) 44.3 kg (97 lb 10.6 oz)          Intake/Output Summary (Last 24 hours) at 12/12/2024 0851  Last data filed at 12/11/2024 1501  Gross per 24 hour   Intake 250 ml   Output 1 ml   Net 249 ml       Physical Exam  Physical Exam  Constitutional:       General: She is not in acute distress.     Appearance: She is not toxic-appearing.      Comments: Elderly female laying in bed   HENT:      Head: Normocephalic.      Mouth/Throat:      Mouth: Mucous membranes are moist.   Cardiovascular:      Rate and Rhythm: Normal rate and regular rhythm.      Heart sounds: No murmur heard.  Pulmonary:      Effort: " Pulmonary effort is normal.      Breath sounds: Normal breath sounds.   Musculoskeletal:      Cervical back: Neck supple.      Right lower leg: No edema.      Left lower leg: No edema.   Skin:     General: Skin is warm and dry.   Neurological:      General: No focal deficit present.      Mental Status: She is alert and oriented to person, place, and time.   Psychiatric:         Mood and Affect: Mood normal.         Behavior: Behavior normal.         Medications  Scheduled medications  acetaminophen, 975 mg, oral, TID  aspirin, 81 mg, oral, Daily  atorvastatin, 20 mg, oral, Daily  carvedilol, 3.125 mg, oral, BID  lidocaine, 2 patch, transdermal, Daily  pantoprazole, 40 mg, oral, Daily before breakfast  polyethylene glycol, 17 g, oral, Daily  sennosides, 1 tablet, oral, Nightly    Continuous medications   PRN medications  PRN medications: oxyCODONE    Labs  Results for orders placed or performed during the hospital encounter of 12/10/24 (from the past 24 hours)   CBC and Auto Differential   Result Value Ref Range    WBC 7.2 4.4 - 11.3 x10*3/uL    nRBC 0.0 0.0 - 0.0 /100 WBCs    RBC 3.88 (L) 4.00 - 5.20 x10*6/uL    Hemoglobin 12.0 12.0 - 16.0 g/dL    Hematocrit 38.2 36.0 - 46.0 %    MCV 99 80 - 100 fL    MCH 30.9 26.0 - 34.0 pg    MCHC 31.4 (L) 32.0 - 36.0 g/dL    RDW 12.5 11.5 - 14.5 %    Platelets 174 150 - 450 x10*3/uL    Neutrophils % 70.1 40.0 - 80.0 %    Immature Granulocytes %, Automated 0.3 0.0 - 0.9 %    Lymphocytes % 18.3 13.0 - 44.0 %    Monocytes % 9.2 2.0 - 10.0 %    Eosinophils % 1.7 0.0 - 6.0 %    Basophils % 0.4 0.0 - 2.0 %    Neutrophils Absolute 5.05 1.60 - 5.50 x10*3/uL    Immature Granulocytes Absolute, Automated 0.02 0.00 - 0.50 x10*3/uL    Lymphocytes Absolute 1.32 0.80 - 3.00 x10*3/uL    Monocytes Absolute 0.66 0.05 - 0.80 x10*3/uL    Eosinophils Absolute 0.12 0.00 - 0.40 x10*3/uL    Basophils Absolute 0.03 0.00 - 0.10 x10*3/uL   Magnesium   Result Value Ref Range    Magnesium 1.92 1.60 - 2.40  mg/dL   Renal Function Panel   Result Value Ref Range    Glucose 91 74 - 99 mg/dL    Sodium 134 (L) 136 - 145 mmol/L    Potassium 4.2 3.5 - 5.3 mmol/L    Chloride 99 98 - 107 mmol/L    Bicarbonate 25 21 - 32 mmol/L    Anion Gap 14 10 - 20 mmol/L    Urea Nitrogen 26 (H) 6 - 23 mg/dL    Creatinine 0.97 0.50 - 1.05 mg/dL    eGFR 57 (L) >60 mL/min/1.73m*2    Calcium 8.4 (L) 8.6 - 10.6 mg/dL    Phosphorus 4.5 2.5 - 4.9 mg/dL    Albumin 3.3 (L) 3.4 - 5.0 g/dL               ASSESSMENT & PLAN  Catarina Cutler is 89 y.o. female with history of HLD, CAD, TAAA s/p R carotid to R subclavian bypass and arch repair for ascending aneurysm and Kommerl's diverticulum repair in 2012 (Dr. Farris and Dr. Gerardo) who presented to [facility] yesterday with back pain that woke her up from sleep at 2am on 12/9. CT shows interval enlargement of aneurysm sac in ascending and descending aorta and possible endoleak.     Cardiac surgery consulted along with vascular surgery for surgical evaluation.     RECOMMENDATIONS/PLAN  Dr. Joel met with patient and reviewed imaging and data and discussed case with vascular surgery team.   After further review and discussion, no surgery intervention to be offered. Recommend medical management only. Continue impulse control. Patient and family updated with plan of care.        Will sign off.   Please page the cardiac surgery consult pager [phone] with any questions or changes in patient condition.      Lyssa Doherty PA-C  Cardiac Surgery Consult [facility]  [facility]  Cardiac Surgery Consult Pager [phone]     12/12/2024  8:51 AM

## 2024-12-13 ENCOUNTER — TELEPHONE (OUTPATIENT)
Dept: PRIMARY CARE | Facility: CLINIC | Age: 89
End: 2024-12-13
Payer: MEDICARE

## 2024-12-13 ENCOUNTER — PATIENT OUTREACH (OUTPATIENT)
Dept: PRIMARY CARE | Facility: CLINIC | Age: 89
End: 2024-12-13
Payer: MEDICARE

## 2024-12-13 DIAGNOSIS — I71.9 AORTIC ANEURYSM, UNSPECIFIED PORTION OF AORTA, UNSPECIFIED WHETHER RUPTURED (CMS-HCC): Primary | ICD-10-CM

## 2024-12-13 DIAGNOSIS — R11.0 NAUSEA: ICD-10-CM

## 2024-12-13 DIAGNOSIS — R52 PAIN: ICD-10-CM

## 2024-12-13 NOTE — PROGRESS NOTES
Discharge facility: [facility]  Discharge diagnosis:  Thoracoabdominal aneurysm (CMS-HCC)    Aneurysm of ascending aorta (CMS-HCC)    Pulmonary nodule    Compression fracture of T5 vertebra (Multi)    CAD (coronary artery disease)    Issues Requiring Follow-Up  Active Problems:    Thoracoabdominal aneurysm      Aneurysm of ascending aorta      Pulmonary nodule    Compression fracture of T5 vertebra (Multi)    CAD (coronary artery disease)     Admission date: 12/10/24  Discharge date: 12/12/24    PCP Appointment Date: No available appointments within 14 days/ message to office   Specialist Appointment Date: 7/18/25 [facility]  Hospital Encounter and Summary: Linked    See Discharge assessment below for further details   Medications  Medications reviewed with patient/caregiver?: Yes (partially reviewed, patient states her dtr does all her medications.) (12/13/2024 11:02 AM)  Is the patient having any side effects they believe may be caused by any medication additions or changes?: No (12/13/2024 11:02 AM)  Does the patient have all medications ordered at discharge?: Yes (12/13/2024 11:02 AM)  Care Management Interventions: Provided patient education (12/13/2024 11:02 AM)  Prescription Comments: Prescriptions sent for acetaminophen 325 mg tablet   aspirin 81 mg EC tablet   atorvastatin 20 mg tablet   carvedilol 3.125 mg tablet   lidocaine 4 % patch   oxyCODONE 5 mg immediate release tablet   pantoprazole 40 mg EC tablet   polyethylene glycol 17 gram/dose powder   sennosides 8.6 mg tablet (12/13/2024 11:02 AM)  Is the patient taking all medications as directed (includes completed medication regime)?: Yes (She states her dtr set up all medications and she thinks she is taking them as listed on discharge instructions) (12/13/2024 11:02 AM)  Care Management Interventions: Provided patient education (12/13/2024 11:02 AM)  Medication Comments: Discussed new medication of polyethylene glycol 17 gram/dose powder   Commonly known as: Glycolax, Miralax  Mix 17 g of powder and drink 2 times a day as  needed (constipation)., patient states oxyCODONE 5 mg immediate release tablet  Commonly known as: Roxicodone  Take 1 tablet (5 mg) by mouth every 6 hours if  needed for moderate pain (4 - 6).lidocaine 4 % patch     Place 2 patches over 12 hours on the skin once  daily. Remove & discard patch within 12 hours or  as directed by MD. (12/13/2024 11:02 AM)  Follow Up Tasks: Medication reconciliation issues (12/13/2024 11:02 AM)    Self Management  What is the home health agency?: n/a (12/13/2024 11:02 AM)  Has home health visited the patient within 72 hours of discharge?: Not applicable (12/13/2024 11:02 AM)  Home Health Interventions: -- (n/a) (12/13/2024 11:02 AM)  What Durable Medical Equipment (DME) was ordered?: n/a (12/13/2024 11:02 AM)  Has all Durable Medical Equipment (DME) been delivered?: -- (n/a) (12/13/2024 11:02 AM)  DME Interventions: -- (n/a) (12/13/2024 11:02 AM)  Care Management Interventions: Notified PCP/provider (12/13/2024 11:02 AM)  Follow Up Tasks: -- (n/a) (12/13/2024 11:02 AM)    Patient Teaching  Does the patient have access to their discharge instructions?: Yes (12/13/2024 11:02 AM)  Care Management Interventions: -- (Patient reports her dtr has instructions and reviewed them) (12/13/2024 11:02 AM)  What is the patient's perception of their health status since discharge?: Same (12/13/2024 11:02 AM)  Is the patient/caregiver able to teach back the hierarchy of who to call/visit for symptoms/problems? PCP, Specialist, Home Health nurse, Urgent Care, ED, 911: Yes (12/13/2024 11:02 AM)  Patient/Caregiver Education Comments: Instructed on hospital discharge instructions. Instructed on new and changed medications. Instructed if increased shortness of breath or chest pains to call 911. Provided my contact information and encouraged to call with any questions. (12/13/2024 11:02 AM)    Wrap Up  Wrap Up Additional  Comments: Hospital follow up call to patient. Patient reports her daughter is assisting her with medications and whatever needs she has. She states she is taking acetaminophen and oxycodone for pain. She feels it has been effective. She is planning to follow up with PCP . She denies any needs at this time (12/13/2024 11:02 AM)

## 2024-12-13 NOTE — TELEPHONE ENCOUNTER
Spoke with Ying, pt daughter, she is requesting a referral for Palliative care. She also wanted to know if there was another med for pt to take for her Aortic pain/ compressed disk. While pt was in hospital Cardiac doc prescribed oxycodone. Has been on rx since Monday and still having pain. Pt will be in for hospital fu 12/31, can we put her in sooner?

## 2024-12-15 LAB
ATRIAL RATE: 72 BPM
P AXIS: 82 DEGREES
P OFFSET: 212 MS
P ONSET: 159 MS
PR INTERVAL: 134 MS
Q ONSET: 226 MS
QRS COUNT: 12 BEATS
QRS DURATION: 90 MS
QT INTERVAL: 426 MS
QTC CALCULATION(BAZETT): 466 MS
QTC FREDERICIA: 452 MS
R AXIS: 77 DEGREES
T AXIS: 91 DEGREES
T OFFSET: 439 MS
VENTRICULAR RATE: 72 BPM

## 2024-12-16 NOTE — TELEPHONE ENCOUNTER
Spoke with Ying, she would like to know the best and fastest route for in home pt care. I gave her  phone number to schedule the palliative care referral but she is now looking for in home services, HH/hospice. Also, a recommended facility, she's not sure who would be best. Please advise.

## 2024-12-17 DIAGNOSIS — I71.9 AORTIC ANEURYSM, UNSPECIFIED PORTION OF AORTA, UNSPECIFIED WHETHER RUPTURED (CMS-HCC): ICD-10-CM

## 2024-12-17 DIAGNOSIS — I71.61: ICD-10-CM

## 2024-12-17 NOTE — TELEPHONE ENCOUNTER
Hospice referral placed and faxed to a few different hospice facilities that called and requested referral.

## 2024-12-19 RX ORDER — OXYCODONE HCL 10 MG/1
10 TABLET, FILM COATED, EXTENDED RELEASE ORAL EVERY 12 HOURS
Qty: 28 TABLET | Refills: 0 | Status: SHIPPED | OUTPATIENT
Start: 2024-12-19 | End: 2025-01-02

## 2024-12-19 RX ORDER — ONDANSETRON 4 MG/1
4 TABLET, ORALLY DISINTEGRATING ORAL EVERY 8 HOURS PRN
Qty: 20 TABLET | Refills: 1 | Status: SHIPPED | OUTPATIENT
Start: 2024-12-19 | End: 2024-12-26

## 2024-12-20 ENCOUNTER — PATIENT OUTREACH (OUTPATIENT)
Dept: PRIMARY CARE | Facility: CLINIC | Age: 89
End: 2024-12-20
Payer: MEDICARE

## 2024-12-20 NOTE — PROGRESS NOTES
Follow up call after hospitalization.  At time of outreach call the patient feels as if their condition  is alright. She states she may be receiving Hospice services. She was having difficulty hearing on the phone then states she has to go and hung up. .

## 2024-12-30 DIAGNOSIS — F32.9 REACTIVE DEPRESSION: Primary | ICD-10-CM

## 2024-12-30 RX ORDER — DULOXETIN HYDROCHLORIDE 20 MG/1
20 CAPSULE, DELAYED RELEASE ORAL DAILY
Qty: 30 CAPSULE | Refills: 3 | Status: SHIPPED | OUTPATIENT
Start: 2024-12-30 | End: 2025-06-28

## 2024-12-31 ENCOUNTER — APPOINTMENT (OUTPATIENT)
Dept: PRIMARY CARE | Facility: CLINIC | Age: 89
End: 2024-12-31
Payer: MEDICARE

## 2025-01-14 DIAGNOSIS — I71.60 THORACOABDOMINAL AORTIC ANEURYSM (TAAA) WITHOUT RUPTURE, UNSPECIFIED PART (CMS-HCC): ICD-10-CM

## 2025-01-14 RX ORDER — GABAPENTIN 100 MG/1
100 CAPSULE ORAL NIGHTLY
Qty: 30 CAPSULE | Refills: 1 | Status: SHIPPED | OUTPATIENT
Start: 2025-01-14 | End: 2025-02-13

## 2025-01-14 RX ORDER — CARVEDILOL 3.12 MG/1
3.12 TABLET ORAL 2 TIMES DAILY
Qty: 180 TABLET | Refills: 3 | Status: SHIPPED | OUTPATIENT
Start: 2025-01-14 | End: 2026-01-14

## 2025-01-23 DIAGNOSIS — I71.60 THORACOABDOMINAL AORTIC ANEURYSM (TAAA) WITHOUT RUPTURE, UNSPECIFIED PART (CMS-HCC): ICD-10-CM

## 2025-01-23 RX ORDER — OXYCODONE HYDROCHLORIDE 5 MG/1
5 TABLET ORAL EVERY 6 HOURS PRN
Qty: 60 TABLET | Refills: 0 | Status: SHIPPED | OUTPATIENT
Start: 2025-01-23

## 2025-02-06 ENCOUNTER — PATIENT OUTREACH (OUTPATIENT)
Dept: PRIMARY CARE | Facility: CLINIC | Age: 89
End: 2025-02-06
Payer: MEDICARE

## 2025-02-06 NOTE — PROGRESS NOTES
Follow up call to patient. She states she is feeling alright. She states she has  sign on with hospice services. Patient disenrolled from TCM services.

## 2025-02-17 DIAGNOSIS — I71.50: Primary | ICD-10-CM

## 2025-02-17 DIAGNOSIS — R52 DIFFUSE PAIN: Primary | ICD-10-CM

## 2025-02-17 RX ORDER — TRAMADOL HYDROCHLORIDE 50 MG/1
50 TABLET ORAL 2 TIMES DAILY PRN
Qty: 30 TABLET | Refills: 0 | Status: SHIPPED | OUTPATIENT
Start: 2025-02-17 | End: 2025-03-04

## 2025-03-20 DIAGNOSIS — R11.0 NAUSEA: ICD-10-CM

## 2025-03-23 RX ORDER — ONDANSETRON 4 MG/1
TABLET, ORALLY DISINTEGRATING ORAL
Qty: 20 TABLET | Refills: 0 | Status: SHIPPED | OUTPATIENT
Start: 2025-03-23

## 2025-03-31 ENCOUNTER — TELEPHONE (OUTPATIENT)
Dept: PRIMARY CARE | Facility: CLINIC | Age: 89
End: 2025-03-31
Payer: MEDICARE

## 2025-03-31 DIAGNOSIS — F51.01 PRIMARY INSOMNIA: Primary | ICD-10-CM

## 2025-03-31 DIAGNOSIS — F32.9 REACTIVE DEPRESSION: ICD-10-CM

## 2025-03-31 RX ORDER — MIRTAZAPINE 7.5 MG/1
7.5 TABLET, FILM COATED ORAL NIGHTLY
Qty: 30 TABLET | Refills: 11 | Status: SHIPPED | OUTPATIENT
Start: 2025-03-31 | End: 2026-03-31

## 2025-04-02 DIAGNOSIS — I71.9 AORTIC ANEURYSM, UNSPECIFIED PORTION OF AORTA, UNSPECIFIED WHETHER RUPTURED: Primary | ICD-10-CM

## 2025-04-02 DIAGNOSIS — I71.21 ANEURYSM OF ASCENDING AORTA WITHOUT RUPTURE: Primary | ICD-10-CM

## 2025-04-02 RX ORDER — TRAMADOL HYDROCHLORIDE 50 MG/1
50 TABLET ORAL EVERY 8 HOURS PRN
Qty: 30 TABLET | Refills: 1 | Status: SHIPPED | OUTPATIENT
Start: 2025-04-02 | End: 2025-05-02

## 2025-05-05 ENCOUNTER — TELEPHONE (OUTPATIENT)
Dept: PRIMARY CARE | Facility: CLINIC | Age: 89
End: 2025-05-05
Payer: MEDICARE

## 2025-07-18 ENCOUNTER — APPOINTMENT (OUTPATIENT)
Dept: PRIMARY CARE | Facility: CLINIC | Age: 89
End: 2025-07-18
Payer: MEDICARE